# Patient Record
Sex: FEMALE | Race: ASIAN | NOT HISPANIC OR LATINO
[De-identification: names, ages, dates, MRNs, and addresses within clinical notes are randomized per-mention and may not be internally consistent; named-entity substitution may affect disease eponyms.]

---

## 2017-01-13 ENCOUNTER — MEDICATION RENEWAL (OUTPATIENT)
Age: 72
End: 2017-01-13

## 2017-02-22 ENCOUNTER — APPOINTMENT (OUTPATIENT)
Dept: CARDIOLOGY | Facility: CLINIC | Age: 72
End: 2017-02-22

## 2017-02-22 VITALS
RESPIRATION RATE: 17 BRPM | WEIGHT: 152 LBS | HEART RATE: 63 BPM | TEMPERATURE: 98.3 F | SYSTOLIC BLOOD PRESSURE: 138 MMHG | BODY MASS INDEX: 27.8 KG/M2 | DIASTOLIC BLOOD PRESSURE: 80 MMHG | OXYGEN SATURATION: 97 %

## 2017-05-02 ENCOUNTER — MEDICATION RENEWAL (OUTPATIENT)
Age: 72
End: 2017-05-02

## 2017-09-11 ENCOUNTER — OUTPATIENT (OUTPATIENT)
Dept: OUTPATIENT SERVICES | Facility: HOSPITAL | Age: 72
LOS: 1 days | End: 2017-09-11
Payer: MEDICARE

## 2017-09-11 ENCOUNTER — APPOINTMENT (OUTPATIENT)
Dept: MAMMOGRAPHY | Facility: IMAGING CENTER | Age: 72
End: 2017-09-11
Payer: MEDICARE

## 2017-09-11 ENCOUNTER — APPOINTMENT (OUTPATIENT)
Dept: ULTRASOUND IMAGING | Facility: IMAGING CENTER | Age: 72
End: 2017-09-11
Payer: MEDICARE

## 2017-09-11 DIAGNOSIS — Z00.8 ENCOUNTER FOR OTHER GENERAL EXAMINATION: ICD-10-CM

## 2017-09-11 PROCEDURE — 77063 BREAST TOMOSYNTHESIS BI: CPT | Mod: 26

## 2017-09-11 PROCEDURE — 77067 SCR MAMMO BI INCL CAD: CPT

## 2017-09-11 PROCEDURE — G0202: CPT | Mod: 26

## 2017-09-11 PROCEDURE — 76641 ULTRASOUND BREAST COMPLETE: CPT

## 2017-09-11 PROCEDURE — 77063 BREAST TOMOSYNTHESIS BI: CPT

## 2017-09-11 PROCEDURE — 76641 ULTRASOUND BREAST COMPLETE: CPT | Mod: 26,50

## 2017-10-17 ENCOUNTER — RX RENEWAL (OUTPATIENT)
Age: 72
End: 2017-10-17

## 2017-10-19 ENCOUNTER — NON-APPOINTMENT (OUTPATIENT)
Age: 72
End: 2017-10-19

## 2017-10-19 ENCOUNTER — APPOINTMENT (OUTPATIENT)
Dept: CARDIOLOGY | Facility: CLINIC | Age: 72
End: 2017-10-19
Payer: MEDICARE

## 2017-10-19 VITALS
RESPIRATION RATE: 17 BRPM | BODY MASS INDEX: 27.44 KG/M2 | WEIGHT: 150 LBS | DIASTOLIC BLOOD PRESSURE: 78 MMHG | SYSTOLIC BLOOD PRESSURE: 128 MMHG | HEART RATE: 65 BPM | TEMPERATURE: 98 F | OXYGEN SATURATION: 96 %

## 2017-10-19 DIAGNOSIS — F41.9 ANXIETY DISORDER, UNSPECIFIED: ICD-10-CM

## 2017-10-19 PROCEDURE — 99214 OFFICE O/P EST MOD 30 MIN: CPT

## 2017-10-19 PROCEDURE — 93000 ELECTROCARDIOGRAM COMPLETE: CPT

## 2017-10-24 ENCOUNTER — OTHER (OUTPATIENT)
Age: 72
End: 2017-10-24

## 2017-11-03 ENCOUNTER — MEDICATION RENEWAL (OUTPATIENT)
Age: 72
End: 2017-11-03

## 2018-04-16 ENCOUNTER — MEDICATION RENEWAL (OUTPATIENT)
Age: 73
End: 2018-04-16

## 2018-06-05 ENCOUNTER — OTHER (OUTPATIENT)
Age: 73
End: 2018-06-05

## 2018-06-19 ENCOUNTER — APPOINTMENT (OUTPATIENT)
Dept: CARDIOLOGY | Facility: CLINIC | Age: 73
End: 2018-06-19
Payer: MEDICARE

## 2018-06-19 VITALS
BODY MASS INDEX: 27.07 KG/M2 | RESPIRATION RATE: 17 BRPM | OXYGEN SATURATION: 95 % | HEART RATE: 61 BPM | WEIGHT: 148 LBS | TEMPERATURE: 97.8 F | DIASTOLIC BLOOD PRESSURE: 73 MMHG | SYSTOLIC BLOOD PRESSURE: 122 MMHG

## 2018-06-19 PROCEDURE — 99214 OFFICE O/P EST MOD 30 MIN: CPT

## 2018-08-20 ENCOUNTER — RX RENEWAL (OUTPATIENT)
Age: 73
End: 2018-08-20

## 2018-09-19 ENCOUNTER — APPOINTMENT (OUTPATIENT)
Dept: MAMMOGRAPHY | Facility: IMAGING CENTER | Age: 73
End: 2018-09-19
Payer: MEDICARE

## 2018-09-19 ENCOUNTER — APPOINTMENT (OUTPATIENT)
Dept: RADIOLOGY | Facility: IMAGING CENTER | Age: 73
End: 2018-09-19
Payer: MEDICARE

## 2018-09-19 ENCOUNTER — OUTPATIENT (OUTPATIENT)
Dept: OUTPATIENT SERVICES | Facility: HOSPITAL | Age: 73
LOS: 1 days | End: 2018-09-19
Payer: MEDICARE

## 2018-09-19 DIAGNOSIS — Z00.8 ENCOUNTER FOR OTHER GENERAL EXAMINATION: ICD-10-CM

## 2018-09-19 PROCEDURE — 77063 BREAST TOMOSYNTHESIS BI: CPT | Mod: 26

## 2018-09-19 PROCEDURE — 77080 DXA BONE DENSITY AXIAL: CPT

## 2018-09-19 PROCEDURE — 77067 SCR MAMMO BI INCL CAD: CPT | Mod: 26

## 2018-09-19 PROCEDURE — 77067 SCR MAMMO BI INCL CAD: CPT

## 2018-09-19 PROCEDURE — 77080 DXA BONE DENSITY AXIAL: CPT | Mod: 26

## 2018-09-19 PROCEDURE — 77063 BREAST TOMOSYNTHESIS BI: CPT

## 2018-09-22 ENCOUNTER — RX RENEWAL (OUTPATIENT)
Age: 73
End: 2018-09-22

## 2018-10-25 ENCOUNTER — RX RENEWAL (OUTPATIENT)
Age: 73
End: 2018-10-25

## 2018-11-01 ENCOUNTER — RX RENEWAL (OUTPATIENT)
Age: 73
End: 2018-11-01

## 2018-11-12 ENCOUNTER — RX RENEWAL (OUTPATIENT)
Age: 73
End: 2018-11-12

## 2018-11-13 ENCOUNTER — MEDICATION RENEWAL (OUTPATIENT)
Age: 73
End: 2018-11-13

## 2018-11-30 ENCOUNTER — MEDICATION RENEWAL (OUTPATIENT)
Age: 73
End: 2018-11-30

## 2019-01-03 ENCOUNTER — APPOINTMENT (OUTPATIENT)
Dept: CARDIOLOGY | Facility: CLINIC | Age: 74
End: 2019-01-03
Payer: MEDICARE

## 2019-01-03 ENCOUNTER — NON-APPOINTMENT (OUTPATIENT)
Age: 74
End: 2019-01-03

## 2019-01-03 VITALS
SYSTOLIC BLOOD PRESSURE: 134 MMHG | RESPIRATION RATE: 18 BRPM | HEART RATE: 85 BPM | TEMPERATURE: 97.8 F | BODY MASS INDEX: 27.44 KG/M2 | OXYGEN SATURATION: 97 % | WEIGHT: 150 LBS | DIASTOLIC BLOOD PRESSURE: 74 MMHG

## 2019-01-03 DIAGNOSIS — R42 DIZZINESS AND GIDDINESS: ICD-10-CM

## 2019-01-03 DIAGNOSIS — R94.31 ABNORMAL ELECTROCARDIOGRAM [ECG] [EKG]: ICD-10-CM

## 2019-01-03 LAB — GLUCOSE BLDC GLUCOMTR-MCNC: 122

## 2019-01-03 PROCEDURE — ZZZZZ: CPT

## 2019-01-03 PROCEDURE — 82962 GLUCOSE BLOOD TEST: CPT

## 2019-01-03 PROCEDURE — 93015 CV STRESS TEST SUPVJ I&R: CPT

## 2019-01-03 PROCEDURE — 99214 OFFICE O/P EST MOD 30 MIN: CPT | Mod: 25

## 2019-01-03 PROCEDURE — 93000 ELECTROCARDIOGRAM COMPLETE: CPT | Mod: 59

## 2019-01-03 PROCEDURE — 93306 TTE W/DOPPLER COMPLETE: CPT

## 2019-01-07 PROBLEM — R42 DIZZINESS: Status: ACTIVE | Noted: 2019-01-07

## 2019-01-08 ENCOUNTER — RX RENEWAL (OUTPATIENT)
Age: 74
End: 2019-01-08

## 2019-01-15 ENCOUNTER — MEDICATION RENEWAL (OUTPATIENT)
Age: 74
End: 2019-01-15

## 2019-01-15 DIAGNOSIS — K59.00 CONSTIPATION, UNSPECIFIED: ICD-10-CM

## 2019-01-15 RX ORDER — DOCUSATE SODIUM 100 MG/1
100 CAPSULE ORAL
Qty: 270 | Refills: 1 | Status: ACTIVE | COMMUNITY
Start: 2019-01-15 | End: 1900-01-01

## 2019-02-07 ENCOUNTER — RX RENEWAL (OUTPATIENT)
Age: 74
End: 2019-02-07

## 2019-03-08 ENCOUNTER — MEDICATION RENEWAL (OUTPATIENT)
Age: 74
End: 2019-03-08

## 2019-04-11 ENCOUNTER — MEDICATION RENEWAL (OUTPATIENT)
Age: 74
End: 2019-04-11

## 2019-06-12 ENCOUNTER — APPOINTMENT (OUTPATIENT)
Dept: ORTHOPEDIC SURGERY | Facility: CLINIC | Age: 74
End: 2019-06-12
Payer: MEDICARE

## 2019-06-12 VITALS
WEIGHT: 150 LBS | HEART RATE: 91 BPM | HEIGHT: 62 IN | BODY MASS INDEX: 27.6 KG/M2 | DIASTOLIC BLOOD PRESSURE: 84 MMHG | SYSTOLIC BLOOD PRESSURE: 154 MMHG

## 2019-06-12 DIAGNOSIS — M47.817 SPONDYLOSIS W/OUT MYELOPATHY OR RADICULOPATHY, LUMBOSACRAL REGION: ICD-10-CM

## 2019-06-12 DIAGNOSIS — M47.814 SPONDYLOSIS W/OUT MYELOPATHY OR RADICULOPATHY, THORACIC REGION: ICD-10-CM

## 2019-06-12 PROCEDURE — 72070 X-RAY EXAM THORAC SPINE 2VWS: CPT

## 2019-06-12 PROCEDURE — 99203 OFFICE O/P NEW LOW 30 MIN: CPT

## 2019-06-12 PROCEDURE — 72100 X-RAY EXAM L-S SPINE 2/3 VWS: CPT

## 2019-06-12 RX ORDER — MELOXICAM 15 MG/1
15 TABLET ORAL
Qty: 14 | Refills: 0 | Status: ACTIVE | COMMUNITY
Start: 2019-06-12 | End: 1900-01-01

## 2019-06-16 ENCOUNTER — EMERGENCY (EMERGENCY)
Facility: HOSPITAL | Age: 74
LOS: 1 days | Discharge: ANOTHER HOSPITAL | End: 2019-06-16
Attending: EMERGENCY MEDICINE | Admitting: EMERGENCY MEDICINE
Payer: MEDICARE

## 2019-06-16 VITALS
HEART RATE: 67 BPM | RESPIRATION RATE: 16 BRPM | OXYGEN SATURATION: 97 % | TEMPERATURE: 98 F | DIASTOLIC BLOOD PRESSURE: 83 MMHG | SYSTOLIC BLOOD PRESSURE: 155 MMHG

## 2019-06-16 PROCEDURE — 99285 EMERGENCY DEPT VISIT HI MDM: CPT | Mod: GC

## 2019-06-16 RX ORDER — MORPHINE SULFATE 50 MG/1
4 CAPSULE, EXTENDED RELEASE ORAL ONCE
Refills: 0 | Status: DISCONTINUED | OUTPATIENT
Start: 2019-06-16 | End: 2019-06-16

## 2019-06-16 NOTE — ED PROVIDER NOTE - OBJECTIVE STATEMENT
74 yoF, PMHx of HTN, otherwise in good health presents to ED with complaint of R back pain, R side pain, and RUQ pain s/p fall 4 days ago with landing on ledge of shower in bathroom. No head trauma. Ambulating since. Saw ortho and had radiographs of ribs which were negative. Tylenol and meloxicam not helping at home. Cannot sleep due to pain. Had recent cough with is worsening her pain as shes still coughing. Denies hip/knee pain. No changes in appetite/bowels/urine. No AC. No syncope/lightheadedness.

## 2019-06-16 NOTE — ED PROVIDER NOTE - PHYSICAL EXAMINATION
PHYSICAL EXAM:  GENERAL: moderate distress, well-groomed, well-developed  HEAD:  Atraumatic, Normocephalic  EYES: EOMI, PERRLA, conjunctiva and sclera clear  ENMT: No tonsillar erythema, exudates, or enlargement; Moist mucous membranes  NECK: Supple, No JVD, no cspine tenderness, no ecchymosis  HEART: Regular rate and rhythm; No murmurs, rubs, or gallops  RESPIRATORY: CTA B/L, shallow breathing, diminished on R, ecchymosis tenderness R lateral, no obvious stepoffs  ABDOMEN: Soft, RUQ tenderness, no guarding  BACK: no midline tenderness, R flank ecchymosis, normal ROM  NEURO: A&Ox3, nonfocal, moving all extremities  EXTREMITIES:  2+ Peripheral Pulses, No clubbing, cyanosis, or edema, no hip/knee tenderness, signs of trauma  SKIN: warm, dry, normal color, no rash or abnormal lesions

## 2019-06-16 NOTE — ED PROVIDER NOTE - PROGRESS NOTE DETAILS
Josselyn PGY1- 4 acute rib fx, trace PE, surgery consulted, rec trauma transfer, Dr. Magana at [facility] ED accepting, Dr. Moy of trauma service accepting

## 2019-06-16 NOTE — ED PROVIDER NOTE - ATTENDING CONTRIBUTION TO CARE
Dr. Ramires: I have personally performed a face to face bedside history and physical examination of this patient. I have discussed the history, examination, review of systems, assessment and plan of management with the resident. I have reviewed the electronic medical record and amended it to reflect my history, review of systems, physical exam, assessment and plan.    74F presents s/p fall 5 days ago. Pt fell onto her right sided, with pain at R chest wall. Pt was seen by her orthopedist, had negative xrays, started on pain meds. No head trauma, sob, vomiting. not on blood thinners.     on exam Dr. Ramires: I have personally performed a face to face bedside history and physical examination of this patient. I have discussed the history, examination, review of systems, assessment and plan of management with the resident. I have reviewed the electronic medical record and amended it to reflect my history, review of systems, physical exam, assessment and plan.    74F presents s/p fall 5 days ago. Pt fell onto her right sided, with pain at R chest wall. Pt was seen by her orthopedist, had negative xrays, started on pain meds. No head trauma, sob, vomiting. not on blood thinners.     on exam elderly, nontoxic  stable vitals  CTA b/l  contusion and ttp at R lateral/posteiror chest. no crepitus.  RRR s 1s2  abd soft, +RUQ tenderness, no rebound  NC/AT  no focal neuro deficits    chest wall contusion, will r/o underlying rib fx, hemothorax, intraabdominal injury, siginificant anemia. Plan for pain control, trauma labs, CT chest/a/p.

## 2019-06-16 NOTE — ED PROVIDER NOTE - CLINICAL SUMMARY MEDICAL DECISION MAKING FREE TEXT BOX
Haverty PGY1- sub acute trauma R sided chest/back/RUQ with ecchymosis, pain with breathing, neg radigoraphs, tylenol/meloxicam not helping, no NVD, bowel/urinary changes, no AC, no head trauma, normal ambualtion  extensive ecchymosis R flank, R chest, tender, no midline spinal tenderness, abd soft, no guarding, RUQ tender, extremities atraumatic, lungs cta, heart rrr, head atraumatic  labs, urine CT CAP, morphine  moderate suspicion for rib fx, CT to eval for traumatic pathology, pain control IS, discharge with med if studies neg

## 2019-06-16 NOTE — ED PROVIDER NOTE - CARE PLAN
Principal Discharge DX:	Closed fracture of multiple ribs of right side, initial encounter  Secondary Diagnosis:	Fall

## 2019-06-16 NOTE — ED ADULT TRIAGE NOTE - CHIEF COMPLAINT QUOTE
Pt comes in for c/o lower right side back pain/flank pain s/p slip and fall in tub Wednesday morning. Pt taking Meloxicam w/out relief prescribed by ortho who saw pt post fall. Pt appears uncomfortable in triage, vs as noted and bruising noted to right lower back/flank area. Pt denies any blood thinners.

## 2019-06-17 ENCOUNTER — EMERGENCY (EMERGENCY)
Facility: HOSPITAL | Age: 74
LOS: 1 days | Discharge: HOME | End: 2019-06-17
Attending: EMERGENCY MEDICINE
Payer: MEDICARE

## 2019-06-17 VITALS
HEART RATE: 83 BPM | RESPIRATION RATE: 16 BRPM | TEMPERATURE: 98 F | SYSTOLIC BLOOD PRESSURE: 183 MMHG | DIASTOLIC BLOOD PRESSURE: 73 MMHG | OXYGEN SATURATION: 100 %

## 2019-06-17 VITALS
SYSTOLIC BLOOD PRESSURE: 163 MMHG | WEIGHT: 149.91 LBS | OXYGEN SATURATION: 94 % | RESPIRATION RATE: 18 BRPM | TEMPERATURE: 98 F | HEART RATE: 65 BPM | DIASTOLIC BLOOD PRESSURE: 91 MMHG | HEIGHT: 61 IN

## 2019-06-17 LAB
ALBUMIN SERPL ELPH-MCNC: 4.4 G/DL — SIGNIFICANT CHANGE UP (ref 3.3–5)
ALP SERPL-CCNC: 102 U/L — SIGNIFICANT CHANGE UP (ref 40–120)
ALT FLD-CCNC: 13 U/L — SIGNIFICANT CHANGE UP (ref 4–33)
ANION GAP SERPL CALC-SCNC: 12 MMO/L — SIGNIFICANT CHANGE UP (ref 7–14)
APPEARANCE UR: CLEAR — SIGNIFICANT CHANGE UP
AST SERPL-CCNC: 18 U/L — SIGNIFICANT CHANGE UP (ref 4–32)
BASE EXCESS BLDV CALC-SCNC: 5.3 MMOL/L — SIGNIFICANT CHANGE UP
BASOPHILS # BLD AUTO: 0.06 K/UL — SIGNIFICANT CHANGE UP (ref 0–0.2)
BASOPHILS NFR BLD AUTO: 0.9 % — SIGNIFICANT CHANGE UP (ref 0–2)
BILIRUB SERPL-MCNC: 0.4 MG/DL — SIGNIFICANT CHANGE UP (ref 0.2–1.2)
BILIRUB UR-MCNC: NEGATIVE — SIGNIFICANT CHANGE UP
BLOOD GAS VENOUS - CREATININE: 0.78 MG/DL — SIGNIFICANT CHANGE UP (ref 0.5–1.3)
BLOOD GAS VENOUS - FIO2: 21 — SIGNIFICANT CHANGE UP
BLOOD UR QL VISUAL: SIGNIFICANT CHANGE UP
BUN SERPL-MCNC: 16 MG/DL — SIGNIFICANT CHANGE UP (ref 7–23)
CALCIUM SERPL-MCNC: 9.3 MG/DL — SIGNIFICANT CHANGE UP (ref 8.4–10.5)
CHLORIDE BLDV-SCNC: 107 MMOL/L — SIGNIFICANT CHANGE UP (ref 96–108)
CHLORIDE SERPL-SCNC: 101 MMOL/L — SIGNIFICANT CHANGE UP (ref 98–107)
CO2 SERPL-SCNC: 26 MMOL/L — SIGNIFICANT CHANGE UP (ref 22–31)
COLOR SPEC: YELLOW — SIGNIFICANT CHANGE UP
CREAT SERPL-MCNC: 0.77 MG/DL — SIGNIFICANT CHANGE UP (ref 0.5–1.3)
EOSINOPHIL # BLD AUTO: 0.16 K/UL — SIGNIFICANT CHANGE UP (ref 0–0.5)
EOSINOPHIL NFR BLD AUTO: 2.4 % — SIGNIFICANT CHANGE UP (ref 0–6)
GAS PNL BLDV: 136 MMOL/L — SIGNIFICANT CHANGE UP (ref 136–146)
GLUCOSE BLDV-MCNC: 129 MG/DL — HIGH (ref 70–99)
GLUCOSE SERPL-MCNC: 133 MG/DL — HIGH (ref 70–99)
GLUCOSE UR-MCNC: NEGATIVE — SIGNIFICANT CHANGE UP
HCO3 BLDV-SCNC: 27 MMOL/L — SIGNIFICANT CHANGE UP (ref 20–27)
HCT VFR BLD CALC: 39.7 % — SIGNIFICANT CHANGE UP (ref 34.5–45)
HCT VFR BLDV CALC: 40.3 % — SIGNIFICANT CHANGE UP (ref 34.5–45)
HGB BLD-MCNC: 13.1 G/DL — SIGNIFICANT CHANGE UP (ref 11.5–15.5)
HGB BLDV-MCNC: 13.1 G/DL — SIGNIFICANT CHANGE UP (ref 11.5–15.5)
IMM GRANULOCYTES NFR BLD AUTO: 0.6 % — SIGNIFICANT CHANGE UP (ref 0–1.5)
KETONES UR-MCNC: NEGATIVE — SIGNIFICANT CHANGE UP
LACTATE BLDV-MCNC: 1.1 MMOL/L — SIGNIFICANT CHANGE UP (ref 0.5–2)
LEUKOCYTE ESTERASE UR-ACNC: NEGATIVE — SIGNIFICANT CHANGE UP
LYMPHOCYTES # BLD AUTO: 1.72 K/UL — SIGNIFICANT CHANGE UP (ref 1–3.3)
LYMPHOCYTES # BLD AUTO: 25.8 % — SIGNIFICANT CHANGE UP (ref 13–44)
MCHC RBC-ENTMCNC: 31.3 PG — SIGNIFICANT CHANGE UP (ref 27–34)
MCHC RBC-ENTMCNC: 33 % — SIGNIFICANT CHANGE UP (ref 32–36)
MCV RBC AUTO: 94.7 FL — SIGNIFICANT CHANGE UP (ref 80–100)
MONOCYTES # BLD AUTO: 0.45 K/UL — SIGNIFICANT CHANGE UP (ref 0–0.9)
MONOCYTES NFR BLD AUTO: 6.7 % — SIGNIFICANT CHANGE UP (ref 2–14)
NEUTROPHILS # BLD AUTO: 4.24 K/UL — SIGNIFICANT CHANGE UP (ref 1.8–7.4)
NEUTROPHILS NFR BLD AUTO: 63.6 % — SIGNIFICANT CHANGE UP (ref 43–77)
NITRITE UR-MCNC: NEGATIVE — SIGNIFICANT CHANGE UP
NRBC # FLD: 0 K/UL — SIGNIFICANT CHANGE UP (ref 0–0)
PCO2 BLDV: 55 MMHG — HIGH (ref 41–51)
PH BLDV: 7.36 PH — SIGNIFICANT CHANGE UP (ref 7.32–7.43)
PH UR: 7 — SIGNIFICANT CHANGE UP (ref 5–8)
PLATELET # BLD AUTO: 170 K/UL — SIGNIFICANT CHANGE UP (ref 150–400)
PMV BLD: 11.3 FL — SIGNIFICANT CHANGE UP (ref 7–13)
PO2 BLDV: 34 MMHG — LOW (ref 35–40)
POTASSIUM BLDV-SCNC: 3.8 MMOL/L — SIGNIFICANT CHANGE UP (ref 3.4–4.5)
POTASSIUM SERPL-MCNC: 4.1 MMOL/L — SIGNIFICANT CHANGE UP (ref 3.5–5.3)
POTASSIUM SERPL-SCNC: 4.1 MMOL/L — SIGNIFICANT CHANGE UP (ref 3.5–5.3)
PROT SERPL-MCNC: 8 G/DL — SIGNIFICANT CHANGE UP (ref 6–8.3)
PROT UR-MCNC: NEGATIVE — SIGNIFICANT CHANGE UP
RBC # BLD: 4.19 M/UL — SIGNIFICANT CHANGE UP (ref 3.8–5.2)
RBC # FLD: 12 % — SIGNIFICANT CHANGE UP (ref 10.3–14.5)
SAO2 % BLDV: 56 % — LOW (ref 60–85)
SODIUM SERPL-SCNC: 139 MMOL/L — SIGNIFICANT CHANGE UP (ref 135–145)
SP GR SPEC: 1.01 — SIGNIFICANT CHANGE UP (ref 1–1.04)
UROBILINOGEN FLD QL: NORMAL — SIGNIFICANT CHANGE UP
WBC # BLD: 6.67 K/UL — SIGNIFICANT CHANGE UP (ref 3.8–10.5)
WBC # FLD AUTO: 6.67 K/UL — SIGNIFICANT CHANGE UP (ref 3.8–10.5)

## 2019-06-17 PROCEDURE — 99218: CPT

## 2019-06-17 PROCEDURE — 71260 CT THORAX DX C+: CPT | Mod: 26

## 2019-06-17 PROCEDURE — 93010 ELECTROCARDIOGRAM REPORT: CPT

## 2019-06-17 PROCEDURE — 74177 CT ABD & PELVIS W/CONTRAST: CPT | Mod: 26

## 2019-06-17 RX ORDER — ACETAMINOPHEN 500 MG
975 TABLET ORAL ONCE
Refills: 0 | Status: COMPLETED | OUTPATIENT
Start: 2019-06-17 | End: 2019-06-17

## 2019-06-17 RX ORDER — IBUPROFEN 200 MG
400 TABLET ORAL EVERY 6 HOURS
Refills: 0 | Status: DISCONTINUED | OUTPATIENT
Start: 2019-06-17 | End: 2019-06-21

## 2019-06-17 RX ORDER — IBUPROFEN 200 MG
600 TABLET ORAL ONCE
Refills: 0 | Status: COMPLETED | OUTPATIENT
Start: 2019-06-17 | End: 2019-06-17

## 2019-06-17 RX ORDER — MORPHINE SULFATE 50 MG/1
4 CAPSULE, EXTENDED RELEASE ORAL ONCE
Refills: 0 | Status: DISCONTINUED | OUTPATIENT
Start: 2019-06-17 | End: 2019-06-17

## 2019-06-17 RX ORDER — TRAMADOL HYDROCHLORIDE 50 MG/1
25 TABLET ORAL EVERY 6 HOURS
Refills: 0 | Status: DISCONTINUED | OUTPATIENT
Start: 2019-06-17 | End: 2019-06-18

## 2019-06-17 RX ORDER — CLONAZEPAM 1 MG
0 TABLET ORAL
Qty: 0 | Refills: 0 | DISCHARGE

## 2019-06-17 RX ORDER — KETOROLAC TROMETHAMINE 30 MG/ML
15 SYRINGE (ML) INJECTION EVERY 6 HOURS
Refills: 0 | Status: DISCONTINUED | OUTPATIENT
Start: 2019-06-17 | End: 2019-06-17

## 2019-06-17 RX ORDER — ACETAMINOPHEN 500 MG
650 TABLET ORAL EVERY 6 HOURS
Refills: 0 | Status: DISCONTINUED | OUTPATIENT
Start: 2019-06-17 | End: 2019-06-21

## 2019-06-17 RX ORDER — LIDOCAINE 4 G/100G
1 CREAM TOPICAL ONCE
Refills: 0 | Status: COMPLETED | OUTPATIENT
Start: 2019-06-17 | End: 2019-06-17

## 2019-06-17 RX ORDER — METOPROLOL TARTRATE 50 MG
0 TABLET ORAL
Qty: 0 | Refills: 0 | DISCHARGE

## 2019-06-17 RX ADMIN — LIDOCAINE 1 PATCH: 4 CREAM TOPICAL at 07:41

## 2019-06-17 RX ADMIN — Medication 975 MILLIGRAM(S): at 09:59

## 2019-06-17 RX ADMIN — Medication 15 MILLIGRAM(S): at 16:54

## 2019-06-17 RX ADMIN — LIDOCAINE 1 PATCH: 4 CREAM TOPICAL at 19:23

## 2019-06-17 RX ADMIN — Medication 600 MILLIGRAM(S): at 11:13

## 2019-06-17 RX ADMIN — Medication 100 MILLIGRAM(S): at 21:46

## 2019-06-17 RX ADMIN — Medication 600 MILLIGRAM(S): at 09:59

## 2019-06-17 RX ADMIN — MORPHINE SULFATE 4 MILLIGRAM(S): 50 CAPSULE, EXTENDED RELEASE ORAL at 05:13

## 2019-06-17 RX ADMIN — Medication 975 MILLIGRAM(S): at 11:13

## 2019-06-17 RX ADMIN — MORPHINE SULFATE 4 MILLIGRAM(S): 50 CAPSULE, EXTENDED RELEASE ORAL at 00:20

## 2019-06-17 RX ADMIN — Medication 200 MILLIGRAM(S): at 16:54

## 2019-06-17 NOTE — ED ADULT NURSE REASSESSMENT NOTE - RESPIRATORY WDL
Breathing spontaneous and unlabored. Breath sounds clear and equal bilaterally with regular rhythm. + pain ti Rt ribs with deep inspiration

## 2019-06-17 NOTE — ED ADULT NURSE REASSESSMENT NOTE - NS ED NURSE REASSESS COMMENT FT1
pt transported to [facility] ED. PT stable at this time with no signs of distress. PT breathing even and unlabored. Report given to JUNITO Olivarez at the transfer center. Pt transported by EMS and medic. Transfer sheet and consent form signed and completed in full and transferred with pt.

## 2019-06-17 NOTE — CONSULT NOTE ADULT - ASSESSMENT
Patient is a 74y old f s/p mechanical fall 4 days ago, has acute right posterolateral eighth through eleventh rib fractures and a trace right pleural effusion. Patient pulling 1500 on incentive spirometry.    PLAN:    - Multimodal pain control: standing acetaminophen 650 q6h alternating with ibuprofen 400mg q6h (so one or the other every 3 hours), lidoderm patch q24h (12 hrs on, 12 hrs off), oxycodone 5mg for breakthrough pain q6h  - Monitor patient for 6 hours (starting now) with only oral pain medication (consider CDU if necessary).  - If pain adequately controlled with PO meds, patient may follow up outpatient as needed.  - Pt should follow up with her PCP in the next 2-3 days.  - No further surgical needs at this time. Please page us with any further questions or concerns.  - D/w Dr. Farnaz Gutierrez, PGY-2  Trauma Surgery  p. [phone]

## 2019-06-17 NOTE — ED CDU PROVIDER INITIAL DAY NOTE - DETAILS
To be completed by Daytime PA 06/17/19 frequent reeval, vitals per routine, pain control, incentive spirometer (10x/hr), cough suppressants, surgery following  d/w attending

## 2019-06-17 NOTE — ED CDU PROVIDER INITIAL DAY NOTE - PROGRESS NOTE DETAILS
The patient is a 36y Female complaining of chest pain. To be completed by Daytime PA 06/17/19 CDU PROGRESS NOTE MIRTA SORIANO: Received pt at 1900 sign-out. Pt resting in stretcher in NAD. Case/plan reviewed. VSS, /72, RR17, HR 70, Temp 98.3 F. Pt ate dinner. Ambulatory around unit with steady gait. S1 S2 noted, RRR, lungs CTA b/l, BS x4 with soft, nontender abdomen. (+) Bruising noted to right flank area w/ ttp. Pt without complaints, declines pain medication currently. Will continue to monitor overnight. CDU PROGRESS NOTE PA BO: Pt resting comfortably, without complaint. NAD, VSS. Will continue to monitor. CDU NOTE MIRTA Ron: pt resting comfortably, feels improved on current pain regimen. pt using incentive spirometer as directed. NAD recent VSS. CDU NOTE MIRTA Ron: pt resting comfortably, feels improved on current pain regimen. pt using incentive spirometer as directed. Though pain improved would still feel more comfortable being observed overnight. no new complaints. NAD recent VSS. PRN pain medication orders in.

## 2019-06-17 NOTE — ED CDU PROVIDER DISPOSITION NOTE - CLINICAL COURSE
This is a 74 yoF, PMHx of HTN, recently treated for upper resp infection since last Monday (finished Z-reddy on Friday), presented to ED with complaint of R back pain, R side pain, and RUQ pain s/p fall 4 days ago with landing on ledge of shower in bathroom. No head trauma. Ambulating since. Saw ortho Dr. Shabazz and had x-rays of ribs which were negative. Tylenol and meloxicam not helping at home.   In ED, patient had CT chest and abdomen/pelvis w/ contrast showing . Acute right posterolateral eighth through eleventh rib fractures as   described above with a trace associated right pleural effusion.  2. No evidence of intra-abdominal or intrapelvic trauma. Pt was transferred to [facility] for trauma eval. Pt sent to CDU for frequent reeval, vitals q 4hrs, pain control, continued incentive spirometry and observation. This is a 74 yoF, PMHx of HTN, recently treated for upper resp infection since last Monday (finished Z-reddy on Friday), presented to ED with complaint of R back pain, R side pain, and RUQ pain s/p fall 4 days ago with landing on ledge of shower in bathroom. No head trauma. Ambulating since. Saw ortho Dr. Shabazz and had x-rays of ribs which were negative. Tylenol and meloxicam not helping at home.   In ED, patient had CT chest and abdomen/pelvis w/ contrast showing . Acute right posterolateral eighth through eleventh rib fractures as described above with a trace associated right pleural effusion. 2. No evidence of intra-abdominal or intrapelvic trauma. Pt was transferred to [facility] for trauma eval. Pt sent to CDU for frequent reeval, vitals q 4hrs, pain control, continued incentive spirometry and observation. This is a 74 yoF, PMHx of HTN, recently treated for upper resp infection since last Monday (finished Z-reddy on Friday), presented to ED with complaint of R back pain, R side pain, and RUQ pain s/p fall 4 days ago with landing on ledge of shower in bathroom. No head trauma. Ambulating since. Saw ortho Dr. Shabazz and had x-rays of ribs which were negative. Tylenol and meloxicam not helping at home.   In ED, patient had CT chest and abdomen/pelvis w/ contrast showing . Acute right posterolateral eighth through eleventh rib fractures as described above with a trace associated right pleural effusion. 2. No evidence of intra-abdominal or intrapelvic trauma. Pt was transferred to [facility] for trauma eval. Pt sent to CDU for frequent reeval, vitals q 4hrs, pain control, continued incentive spirometry and observation.    While in the CDu, patient responded well to pain medication.  Using incentive spirometer appropriately.  Trauma consult recommending outpatient follow up.  Patient medically stable for discharge home.

## 2019-06-17 NOTE — ED ADULT NURSE REASSESSMENT NOTE - NS ED NURSE REASSESS COMMENT FT1
pt family at bedside pt verbalized decrease in pain with motrin and tylenol pt pending CDU transport and report

## 2019-06-17 NOTE — ED CDU PROVIDER INITIAL DAY NOTE - CHIEF COMPLAINT
The patient is a 74y Female complaining of trauma. The patient is a 74y Female complaining of R sided pain

## 2019-06-17 NOTE — ED ADULT NURSE NOTE - NSIMPLEMENTINTERV_GEN_ALL_ED
Implemented All Fall Risk Interventions:  [place] to call system. Call bell, personal items and telephone within reach. Instruct patient to call for assistance. Room bathroom lighting operational. Non-slip footwear when patient is off stretcher. Physically safe environment: no spills, clutter or unnecessary equipment. Stretcher in lowest position, wheels locked, appropriate side rails in place. Provide visual cue, wrist band, yellow gown, etc. Monitor gait and stability. Monitor for mental status changes and reorient to person, place, and time. Review medications for side effects contributing to fall risk. Reinforce activity limits and safety measures with patient and family.

## 2019-06-17 NOTE — ED PROVIDER NOTE - CLINICAL SUMMARY MEDICAL DECISION MAKING FREE TEXT BOX
74F w/ anxiety and HTN presents as transfer from [facility] for trauma eval for 4 R rib fractures. Will need pain control, incentive spirometry. Other labs and imaging obtained at [facility]. Pts daughter gave 2mg ativan, so will hold off on additional opiates at this time until pt's mental status is reassessed after benzos. Trauma admission vs. dc

## 2019-06-17 NOTE — ED CDU PROVIDER INITIAL DAY NOTE - NS_EDPROVIDERDISPOUSERTYPE_ED_A_ED
CONST: no fevers, no chills  EYES: no pain, no vision changes  ENT: no sore throat, no ear pain, no change in hearing, +left jaw pain  CV: no chest pain, no leg swelling  RESP: no shortness of breath, no cough  ABD: no abdominal pain, no nausea, no vomiting, no diarrhea  : no dysuria, no flank pain, no hematuria  MSK: no back pain, no extremity pain  NEURO: no headache or additional neurologic complaints  HEME: no easy bleeding  SKIN:  no rash
Attending Attestation (For Attendings USE Only)...

## 2019-06-17 NOTE — ED CDU PROVIDER DISPOSITION NOTE - ATTENDING CONTRIBUTION TO CARE
I performed a history and physical exam of the patient and discussed their management with the PA. I reviewed the PA's note and agree with the documented findings and plan of care. Patient is stable for discharge with analgesia, incentive spirometry and follow up in Surgery clinics.  ATTG: Dr. Drew

## 2019-06-17 NOTE — ED CDU PROVIDER INITIAL DAY NOTE - PHYSICAL EXAMINATION
To be completed by Daytime PA 06/17/19 chest wall- R side rib area with ecchymosis, ttp, no crepitus. no flail chest. spine- no midline tenderness. pelvis stable. b/l LE- non-tender, FROM. b/l UE- non-tender, FROM. no obvious head trauma.

## 2019-06-17 NOTE — ED CDU PROVIDER DISPOSITION NOTE - PLAN OF CARE
1. Stay hydrated. Avoid strenous activity, twisting and heavy lifting.  2. Take Ibuprofen 600mg every 6hrs for pain as needed- take with food.   3. Follow up with your PCP  24-48 hours  4. Return to ED for worsening of symptoms including fever, weakness, numbness, and all other concerns. 1. Stay hydrated. Avoid strenuous activity, twisting and heavy lifting.  2.  Take Tylenol 650mgs every 4-6 hrs and/or Ibuprofen 600mgs every 6 hrs as needed for mild to moderate pain  3.  Take Tramadol 25-50mgs every 8 hrs as needed for severe pain  4.  Apply Lidoderm patch to affected area.  Leave on for 12 hours, then take off for 12 hrs.  If prescription is not covered by insurance, you can purchase salonpas over-the-counter  5.  Take Tessalon perles 1 tab every 8 hours as needed for cough.  6.  Use incentive spirometer 10x per hour while awake  7.  Follow up with your PMD in 2-3 days.  Bring a copy of your results with you  8.  Return to the ER for worsening pain, shortness of breath, fevers/chills or any other concerning symptoms

## 2019-06-17 NOTE — ED CDU PROVIDER INITIAL DAY NOTE - ATTENDING CONTRIBUTION TO CARE
Nemes - 75yo F w/ hx of HTN transferred from [facility] for 4 rib fractures and a small hemothorax vs pleural effusion (accepted by Trauma). Fall occurred 4 days ago at home, fell in the shower on to the shower edge. C/o pain to R side, received 4mg morphine IV for pain at [facility]. On exam significant ecchymosis to R chest and flank, TTP, lungs clear, no other signs of trauma. Trauma consult recommend pain meds and possible DC given injury sustained 4 days ago. D/w patient and CDU PA, will observe overnight for pain control.

## 2019-06-17 NOTE — ED PROVIDER NOTE - OBJECTIVE STATEMENT
74F w/ hx of HTN presents as a transfer from [facility] for 4 rib fractures and a small hemothorax vs pleural effusion. Fall occurred 4 days ago at home, fell in the shower on to the shower edge. Pt has resolving URI and has lingering cough from that. States it is extraordinarily painful when she coughs. WEnt to orthopedist a few days ago and was given meloxicam, which hasn't helped. Pt denies head strike. Not on blood thinners. Labs at [facility] showed normal CBC, CMP, VBG, UA. CT A/P wnl. CT chest showed "acute right posterolateral eighth through eleventh rib fractures as described above with a trace associated right pleural effusion." Pt received 4mg morphine IV for pain at [facility]. She was evaluated by surgery team there who recommended transfer to [facility] for trauma eval. Pt's daughter seen giving patient home 2mg ativan after provider left the room here.

## 2019-06-17 NOTE — ED ADULT NURSE NOTE - OBJECTIVE STATEMENT
pt a&o x3, daughter at bedside as supplemental , c/o back pain s/p mechanical fall on wednesday. was seen by  and given meloxicom, no pain relief from tylenol or meloxicom. pt ambulatory steadily. right back bruising noted. states pain radiates to right side of abdomen. right ac 20g placed; md assessed. will monitor

## 2019-06-17 NOTE — CONSULT NOTE ADULT - SUBJECTIVE AND OBJECTIVE BOX
[facility] General Surgery Consultation     HPI:  This is a 74 yoF, PMHx of HTN, recently treated for upper resp infection since last Monday (finished Z-reddy on Friday), otherwise in good health presents to ED with complaint of R back pain, R side pain, and RUQ pain s/p fall 4 days ago with landing on ledge of shower in bathroom. No head trauma. Ambulating since. Saw ortho Dr. Shabazz and had x-rays of ribs which were negative. Tylenol and meloxicam not helping at home. Cannot sleep due to pain. Had recent cough with is worsening her pain as shes still coughing. Denies hip/knee pain. No changes in appetite/bowels/urine. No AC. No syncope/lightheadedness.    Primary Survey:    A - airway intact  B - bilateral breath sounds   C - initial BP: 149/71 (19 @ 04:31)*** , HR: 78 (19 @ 04:31)*** , palpable pulses in all extremities  D - GCS 15 on arrival  Exposure obtained    Secondary Survey:   General: mild distress   HEENT: Normocephalic, atraumatic   Neck: Soft, midline trachea  Chest: right chest wall tenderness, right chest wall ecchymosis   Cardiac: RRR.   Respiratory: Bilateral breath sounds, clear and decreased at base on right side.   Abdomen: Soft, non-distended, non-tender, no rebound, no guarding, no ecchymosis.   Pelvis: Stable, non-tender.   Ext: palp radial b/l UE, b/l DP palp in Lower Extrem.   Back: no TTP, no palpable runoff/stepoff/deformity, no abrasions.     ROS: 10-system review is otherwise negative except HPI above.        PAST MEDICAL & SURGICAL HISTORY:  Anxiety  HTN (hypertension)  Chronic Hep B  No significant past surgical history      ROS: 10-point review of systems   FAMILY HISTORY:      SOCIAL HISTORY:    MEDICATIONS  (STANDING):  acetaminophen   Tablet .. 975 milliGRAM(s) Oral once  ibuprofen  Tablet. 600 milliGRAM(s) Oral Once    MEDICATIONS  (PRN):    MEDICATIONS (home)  clonazePAM: 1mg qHS PRN (2019 07:13)  metoprolol:  50mg qDay in AM and 25mg qDay in PM (2019 07:13)  meloxicam 15mg qDay (since Wednesday)  Enalapril 10mg BID  Vemlidy 25mg qDay      Allergies    penicillin (Hives)    Intolerances        Vital Signs Last 24 Hrs  T(C): 36.7 (2019 07:13), Max: 37.2 (2019 04:31)  T(F): 98.1 (2019 07:13), Max: 98.9 (2019 04:31)  HR: 60 (2019 08:29) (60 - 83)  BP: 122/75 (2019 08:29) (122/75 - 183/73)  BP(mean): --  RR: 16 (2019 08:29) (16 - 20)  SpO2: 95% (2019 08:29) (94% - 100%)  Daily Height in cm: 154.94 (2019 07:00)    Daily                             13.1   6.67  )-----------( 170      ( 2019 00:30 )             39.7         139  |  101  |  16  ----------------------------<  133<H>  4.1   |  26  |  0.77    Ca    9.3      2019 00:30    TPro  8.0  /  Alb  4.4  /  TBili  0.4  /  DBili  x   /  AST  18  /  ALT  13  /  AlkPhos  102        Urinalysis Basic - ( 2019 00:30 )    Color: YELLOW / Appearance: CLEAR / S.010 / pH: 7.0  Gluc: NEGATIVE / Ketone: NEGATIVE  / Bili: NEGATIVE / Urobili: NORMAL   Blood: TRACE / Protein: NEGATIVE / Nitrite: NEGATIVE   Leuk Esterase: NEGATIVE / RBC: x / WBC x   Sq Epi: x / Non Sq Epi: x / Bacteria: x    Radiographic Findings:     < from: CT Chest w/ IV Cont (19 @ 02:27) >  FINDINGS:    CHEST:     LUNGS AND LARGE AIRWAYS: Patent central airways. No pulmonary nodules,   masses or consolidation. Right upper lobe calcified granuloma. Right   lower lobe compressive atelectasis.  PLEURA: Trace right pleural effusion. No pneumothorax.  VESSELS: Within normal limits.  HEART: Heart size is normal. No pericardial effusion.  MEDIASTINUM AND DALIA: No lymphadenopathy.  CHEST WALL AND LOWER NECK: 4 mm calcification in the left lobe of the   thyroid. Old anterior right fourth rib fracture deformity. Acute, mildly   displaced posterolateral right eighth rib fracture. Acute, displaced   posterolateral right ninth rib fracture with the anterior fracture   fragment posteriorly overriding the posterior fracture fragment. Impacted   posterior right tenth rib fracture. Nondisplaced posterior right eleventh   rib fracture.    ABDOMEN AND PELVIS:    LIVER: Within normal limits.  BILE DUCTS: Normal caliber.  GALLBLADDER: Distended with a 1.0 cm gallstone in the neck. No associated   inflammatory changes. Unchanged from prior study in 2016.  SPLEEN: Within normal limits.  PANCREAS: Within normal limits.  ADRENALS: Within normal limits.  KIDNEYS/URETERS: Right renal cyst. Left kidney within normal limits.    BLADDER: Within normal limits.  REPRODUCTIVE ORGANS: Fibroid uterus. Ovaries are within normal limits.    BOWEL: Nobowel obstruction inflammation. Appendix is normal.  PERITONEUM: No ascites.  VESSELS:  Atherosclerotic calcifications.  RETROPERITONEUM: No lymphadenopathy.    ABDOMINAL WALL: Moderate right flank subcutaneous inflammatory change.  BONES: Degenerative changes of the spine.    IMPRESSION:     1. Acute right posterolateral eighth through eleventh rib fractures as   described above with a trace associated right pleural effusion.  2. No evidence of intra-abdominal or intrapelvic trauma.    < end of copied text >

## 2019-06-17 NOTE — CONSULT NOTE ADULT - SUBJECTIVE AND OBJECTIVE BOX
HPI: Patient is a 74y old  Female who presents with a chief complaint of right chest wall pain     This is a 74 yoF, PMHx of HTN, recently treated for upper resp infection since Monday, otherwise in good health presents to ED with complaint of R back pain, R side pain, and RUQ pain s/p fall 4 days ago with landing on ledge of shower in bathroom. No head trauma. Ambulating since. Saw ortho Dr. Shabazz and had x-rays of ribs which were negative. Tylenol and meloxicam not helping at home. Cannot sleep due to pain. Had recent cough with is worsening her pain as shes still coughing. Denies hip/knee pain. No changes in appetite/bowels/urine. No AC. No syncope/lightheadedness.    Primary Survey:    A - airway intact  B - bilateral breath sounds   C - initial BP: 149/71 (19 @ 04:31)*** , HR: 78 (19 @ 04:31)*** , palpable pulses in all extremities  D - GCS 15 on arrival  Exposure obtained    Secondary Survey:   General: mild distress   HEENT: Normocephalic, atraumatic   Neck: Soft, midline trachea  Chest: right chest wall tenderness, right chest wall ecchymosis   Cardiac: RRR.   Respiratory: Bilateral breath sounds, clear and decreased at base on right side.   Abdomen: Soft, non-distended, non-tender, no rebound, no guarding, no ecchymosis.   Pelvis: Stable, non-tender.   Ext: palp radial b/l UE, b/l DP palp in Lower Extrem.   Back: no TTP, no palpable runoff/stepoff/deformity, no abrasions.     ROS: 10-system review is otherwise negative except HPI above.      LABS:      CBC Full  -  ( 2019 00:30 )  WBC Count : 6.67 K/uL  RBC Count : 4.19 M/uL  Hemoglobin : 13.1 g/dL  Hematocrit : 39.7 %  Platelet Count - Automated : 170 K/uL  Mean Cell Volume : 94.7 fL  Mean Cell Hemoglobin : 31.3 pg  Mean Cell Hemoglobin Concentration : 33.0 %  Auto Neutrophil # : 4.24 K/uL  Auto Lymphocyte # : 1.72 K/uL  Auto Monocyte # : 0.45 K/uL  Auto Eosinophil # : 0.16 K/uL  Auto Basophil # : 0.06 K/uL  Auto Neutrophil % : 63.6 %  Auto Lymphocyte % : 25.8 %  Auto Monocyte % : 6.7 %  Auto Eosinophil % : 2.4 %  Auto Basophil % : 0.9 %        139  |  101  |  16  ----------------------------<  133<H>  4.1   |  26  |  0.77    Ca    9.3      2019 00:30    TPro  8.0  /  Alb  4.4  /  TBili  0.4  /  DBili  x   /  AST  18  /  ALT  13  /  AlkPhos  102            Urinalysis Basic - ( 2019 00:30 )    Color: YELLOW / Appearance: CLEAR / S.010 / pH: 7.0  Gluc: NEGATIVE / Ketone: NEGATIVE  / Bili: NEGATIVE / Urobili: NORMAL   Blood: TRACE / Protein: NEGATIVE / Nitrite: NEGATIVE   Leuk Esterase: NEGATIVE / RBC: x / WBC x   Sq Epi: x / Non Sq Epi: x / Bacteria: x                RADIOLOGY & ADDITIONAL STUDIES (The following images were personally reviewed):  PROCEDURE:   CT of the Chest, Abdomen and Pelvis was performed with intravenous   contrast.   Imaging was performed through the chest in the arterial phase followed by   imaging of the abdomen and pelvis in the portal venous phase.  Intravenous contrast: 90 ml Omnipaque 350. 10 ml discarded.  Oral contrast: None.  Sagittal and coronal reformats were performed.    FINDINGS:    CHEST:     LUNGS AND LARGE AIRWAYS: Patent central airways. No pulmonary nodules,   masses or consolidation. Right upper lobe calcified granuloma. Right   lower lobe compressive atelectasis.  PLEURA: Trace right pleural effusion. No pneumothorax.  VESSELS: Within normal limits.  HEART: Heart size is normal. No pericardial effusion.  MEDIASTINUM AND DALIA: No lymphadenopathy.  CHEST WALL AND LOWER NECK: 4 mm calcification in the left lobe of the   thyroid. Old anterior right fourth rib fracture deformity. Acute, mildly   displaced posterolateral right eighth rib fracture. Acute, displaced   posterolateral right ninth rib fracture with the anterior fracture   fragment posteriorly overriding the posterior fracture fragment. Impacted   posterior right tenth rib fracture. Nondisplaced posterior right eleventh   rib fracture.    ABDOMEN AND PELVIS:    LIVER: Within normal limits.  BILE DUCTS: Normal caliber.  GALLBLADDER: Distended with a 1.0 cm gallstone in the neck. No associated   inflammatory changes. Unchanged from prior study in 2016.  SPLEEN: Within normal limits.  PANCREAS: Within normal limits.  ADRENALS: Within normal limits.  KIDNEYS/URETERS: Right renal cyst. Left kidney within normal limits.    BLADDER: Within normal limits.  REPRODUCTIVE ORGANS: Fibroid uterus. Ovaries are within normal limits.    BOWEL: No bowel obstruction inflammation. Appendix is normal.  PERITONEUM: No ascites.  VESSELS:  Atherosclerotic calcifications.  RETROPERITONEUM: No lymphadenopathy.    ABDOMINAL WALL: Moderate right flank subcutaneous inflammatory change.  BONES: Degenerative changes of the spine.    IMPRESSION:     1. Acute right posterolateral eighth through eleventh rib fractures as   described above with a trace associated right pleural effusion.  2. No evidence of intra-abdominal or intrapelvic trauma.        ---------------------------------------------------------------------------------------  ASSESSMENT: Patient is a 74y old f s/p mechanical fall 4 days ago, has acute right posterolateral eighth through eleventh rib fractures  PLAN:    - pain control   - will be transferred to [facility] for evaluation by the trauma team.    - Patient see, discussed with attending, Dr. Burch     [id number]

## 2019-06-17 NOTE — ED PROVIDER NOTE - PHYSICAL EXAMINATION
General: WN/WD NAD  HEENT: PERRLA, EOMI, moist mucous membranes  Neurology: A&Ox3, nonfocal, GREENE x 4  Respiratory: CTA B/L, normal respiratory effort, no wheezes, crackles, rales, TTP over the R lower ribs  CV: RRR, S1S2, no murmurs, rubs or gallops  Abdominal: Soft, NT, ND +BS, Last BM  Extremities: No edema, + peripheral pulses  Incisions: None  Tubes: PIV

## 2019-06-17 NOTE — ED ADULT NURSE NOTE - OBJECTIVE STATEMENT
pt 73 yo Mandarin speaking female transfer from [facility] for rib fx r side with small pleural effusin right side pt alert Mandarin speaking fall 4 dys ago saw ortho that day for eval no findings pain with bruising continued on arrival pt accompanied by daughter alert oriented bruising right mid gavi rad to front bilateral clear breath sounds no chest pain

## 2019-06-17 NOTE — ED CDU PROVIDER INITIAL DAY NOTE - OBJECTIVE STATEMENT
To be completed by Daytime PA 06/17/19 Daughter translating at patient request  74F w/ hx of HTN presented to ED as a transfer from [facility] for 4 rib fractures and trace pleural effusion. Fall occurred 4 days ago at home, fell in the shower on to the shower edge. Pt has lingering cough from URI- treated with z-reddy (completed), which exacerbates pain.  pt went to orthopedist day of event and had x-rays done that were negative for fractures and was given meloxicam, which hasn't helped. denies head injury, LOC. denies all other injuries.  Not on blood thinners. Labs at [facility] showed normal CBC, CMP, VBG, UA. CT A/P wnl. CT chest showed "acute right posterolateral eighth through eleventh rib fractures as described above with a trace associated right pleural effusion." Pt received 4mg morphine IV for pain at [facility]. She was evaluated by surgery team there who recommended transfer to [facility] for trauma eval. pain moderate now after meds by ED. denies sob/dyspnea. denies fever, numbness, tingling, weakness of arm/leg.

## 2019-06-17 NOTE — ED PROVIDER NOTE - NS ED ROS FT
REVIEW OF SYSTEMS:    Constitutional:     [ ] negative [- ] fevers [ -] chills [ ] weight loss [ ] weight gain  HEENT:                  [ ] negative [ ] dry eyes [ ] eye irritation [ ] postnasal drip [ ] nasal congestion  CV:                         [ ] negative  [+ ] chest pain [- ] orthopnea [ ] palpitations [ ] murmur  Resp:                     [ ] negative [+ ] cough [ -] shortness of breath [- ] dyspnea [ -] wheezing [ ] sputum [ ] hemoptysis  GI:                          [ ] negative [ -] nausea [ -] vomiting [ ] diarrhea [ ] constipation [ ] abd pain [ ] dysphagia   :                        [ ] negative [ ] dysuria [ ] nocturia [ ] hematuria [ ] increased urinary frequency  Musculoskeletal: [ ] negative [ -] back pain [ ] myalgias [ ] arthralgias [ ] fracture  Skin:                       [ ] negative [ -] rash [ ] itch  Neurological:        [ ] negative [ -] headache [ ] dizziness [ ] syncope [ ] weakness [ ] numbness  Psychiatric:           [ ] negative [ ] anxiety [ ] depression  Endocrine:            [ ] negative [- ] diabetes [ ] thyroid problem  Heme/Lymph:      [ ] negative [ ] anemia [ ] bleeding problem  Allergic/Immune: [ ] negative [ ] itchy eyes [ ] nasal discharge [ ] hives [ ] angioedema    [x ] All other systems negative  [ ] Unable to assess ROS because ________.

## 2019-06-17 NOTE — ED CDU PROVIDER DISPOSITION NOTE - NSFOLLOWUPINSTRUCTIONS_ED_ALL_ED_FT
1. Stay hydrated. Avoid strenuous activity, twisting and heavy lifting.  2.  Take Tylenol 650mgs every 4-6 hrs and/or Ibuprofen 600mgs every 6 hrs as needed for mild to moderate pain  3.  Take Tramadol 25-50mgs every 8 hrs as needed for severe pain  4.  Apply Lidoderm patch to affected area.  Leave on for 12 hours, then take off for 12 hrs.  If prescription is not covered by insurance, you can purchase salonpas over-the-counter  5.  Take Tessalon perles 1 tab every 8 hours as needed for cough.  6.  Use incentive spirometer 10x per hour while awake  7.  Follow up with your PMD in 2-3 days.  Bring a copy of your results with you  8.  Return to the ER for worsening pain, shortness of breath, fevers/chills or any other concerning symptoms

## 2019-06-18 VITALS
RESPIRATION RATE: 16 BRPM | TEMPERATURE: 98 F | HEART RATE: 62 BPM | SYSTOLIC BLOOD PRESSURE: 151 MMHG | OXYGEN SATURATION: 97 % | DIASTOLIC BLOOD PRESSURE: 78 MMHG

## 2019-06-18 PROCEDURE — 96374 THER/PROPH/DIAG INJ IV PUSH: CPT

## 2019-06-18 PROCEDURE — 99217: CPT

## 2019-06-18 PROCEDURE — 99284 EMERGENCY DEPT VISIT MOD MDM: CPT | Mod: 25

## 2019-06-18 PROCEDURE — G0378: CPT

## 2019-06-18 PROCEDURE — 93005 ELECTROCARDIOGRAM TRACING: CPT

## 2019-06-18 RX ORDER — LIDOCAINE 4 G/100G
1 CREAM TOPICAL
Qty: 7 | Refills: 0
Start: 2019-06-18

## 2019-06-18 RX ORDER — TRAMADOL HYDROCHLORIDE 50 MG/1
1 TABLET ORAL
Qty: 9 | Refills: 0
Start: 2019-06-18 | End: 2019-06-20

## 2019-06-18 RX ADMIN — Medication 400 MILLIGRAM(S): at 02:14

## 2019-06-18 RX ADMIN — Medication 400 MILLIGRAM(S): at 03:43

## 2019-06-18 RX ADMIN — TRAMADOL HYDROCHLORIDE 25 MILLIGRAM(S): 50 TABLET ORAL at 04:50

## 2019-06-18 RX ADMIN — Medication 100 MILLIGRAM(S): at 05:54

## 2019-06-18 NOTE — ED CDU PROVIDER SUBSEQUENT DAY NOTE - MEDICAL DECISION MAKING DETAILS
Patient is resting comfortably with pain under control. If patient continues to tolerate well, will discharge home with analgesia, incentive spirometry and folllow up in Surgery clinics.  ATTG: Dr. Drew

## 2019-06-18 NOTE — ED CDU PROVIDER SUBSEQUENT DAY NOTE - PROGRESS NOTE DETAILS
CDU PROGRESS NOTE PA BO: Pt c/o pain to right flank/back 5/10 in severity. Will give Ibuprofen 400mg po and monitor. CDU PROGRESS NOTE PA BO: Pt states pain returning, requesting medication. Will give Tramadol 25mg po and monitor. CDU PROGRESS NOTE PA BO: Pt resting comfortably, feeling well without complaint. NAD, Lungs CTAB. Will continue to monitor. Patient seen at bedside in NAD.  VSS.  Patient resting comfortably without complaints. Pain well controlled on tramadol.  Patient educated on incentive spirometer and pain medication use.  Strict return precautions provided.  Patient and family at bedside express understanding.  -Brian Nicholas PA-C

## 2019-06-18 NOTE — ED CDU PROVIDER SUBSEQUENT DAY NOTE - PHYSICAL EXAMINATION
Gen: AAO x 3, NAD  Skin: ecchymosis  over right chest wall  HEENT: NC/AT, PERRLA, EOMI, MMM  Resp: unlabored CTAB  Cardiac: rrr s1s2, no murmurs, rubs or gallops.  right chest wall ttp, no crepitus  GI: ND, +BS, Soft, NT  Ext: no pedal edema, FROM in all extremities  MSK: No midline cervical/thoracic/lumbar TTP, compartments soft, FROM in all extremities   Neuro: no focal deficits

## 2019-06-18 NOTE — ED ADULT NURSE REASSESSMENT NOTE - RESPONSE TO
Please call patient to schedule 2 week and 6 week postpartum visits:    Date of Delivery: 10/19/17  Anticipated Date of Discharge: 10/21/17    Other:  Infant currently admitted to NICU, will hold on NBV at this time. Please confirm with mother where baby will be seen as baby's PCP listed is from [facility].    Please document all calls and route back to [facility] OB/GYN COORDINATOR.    Tiny Hurtado RN       response to care/treatments:

## 2019-06-18 NOTE — ED ADULT NURSE REASSESSMENT NOTE - NS ED NURSE REASSESS COMMENT FT1
Report taken from Ashley DURÁN RN. states pt have a good night with no complaints. Will continue to monitor. Report taken from Ashley DURÁN RN. states pt have a good night overall.  Woke up with pain. Pt given pain meds and went back to sleep. No complaints after pain meds. Will continue to monitor.

## 2019-06-18 NOTE — ED CDU PROVIDER SUBSEQUENT DAY NOTE - HISTORY
CDU PROGRESS NOTE PA BO: Pt resting comfortably, reports having cough, declines pain medication currently. Patient pulling 1500 on incentive spirometry. Will continue to monitor.

## 2019-06-18 NOTE — ED ADULT NURSE REASSESSMENT NOTE - GENERAL PATIENT STATE
resting/sleeping/pt sleeping at present
comfortable appearance/cooperative
comfortable appearance/family/SO at bedside/cooperative/improvement verbalized

## 2019-06-20 PROBLEM — F41.9 ANXIETY DISORDER, UNSPECIFIED: Chronic | Status: ACTIVE | Noted: 2019-06-17

## 2019-06-20 PROBLEM — I10 ESSENTIAL (PRIMARY) HYPERTENSION: Chronic | Status: ACTIVE | Noted: 2019-06-16

## 2019-06-21 ENCOUNTER — APPOINTMENT (OUTPATIENT)
Dept: ORTHOPEDIC SURGERY | Facility: CLINIC | Age: 74
End: 2019-06-21
Payer: MEDICARE

## 2019-06-21 DIAGNOSIS — S22.41XA MULTIPLE FRACTURES OF RIBS, RIGHT SIDE, INITIAL ENCOUNTER FOR CLOSED FRACTURE: ICD-10-CM

## 2019-06-21 PROCEDURE — 99214 OFFICE O/P EST MOD 30 MIN: CPT

## 2019-06-21 RX ORDER — TRAMADOL HYDROCHLORIDE 50 MG/1
50 TABLET, COATED ORAL 3 TIMES DAILY
Qty: 60 | Refills: 0 | Status: ACTIVE | COMMUNITY
Start: 2019-06-21 | End: 1900-01-01

## 2019-06-22 NOTE — ED POST DISCHARGE NOTE - REASON FOR FOLLOW-UP
Other pts daughter called, would like refill of tessalon perles for cough, discussed she needs to f/u with PCP, although PCP is closed. Discussed we cannot give refills on medications can return to ed for reevaluation or go to urgent care, she notes understanding. -Anum Martinez PA-C

## 2019-06-23 NOTE — HISTORY OF PRESENT ILLNESS
[de-identified] : Patient is here today for evaluation of right rib pain secondary to a fall in her bathroom on 6/12/19. She was seen at [facility] this past Sunday, she had a CT scan and was diagnosed with 4 rib fractures on the right side involving ribs 8-11. She was discharged on Motrin and tramadol which she has found helpful. She is growing a lidocaine patch but she does not find much relief from. The patient has no shortness of breath, no difficulty breathing. She has recently been evaluated by her PCP and was being treated for a cough prior to her recent fall with cough suppressants.  She still has a mild cough and has increased rib pain when she is coughing. The patient's daughter is present with her at the office visit and is translating the patient's primary language.

## 2019-06-23 NOTE — DISCUSSION/SUMMARY
[de-identified] : Discussed findings of today's exam and possible causes of patient's pain.  Educated patient on their diagnosis of right ribs 8-11 fractures.  Reviewed possible courses of treatment, and we collaboratively decided best course of treatment at this time will include conservative management. We reviewed the patient's CT scan which shows minimal displacement of her fracture is within ribs 8-9, no displacement of ribs 10-11.  While the patient does have 4 consecutive rib fractures, they are not causing her any difficulty breathing, and she has not developed any subsequent flail chest. At this time I believe patient will benefit from nonsurgical management. I had a lengthy discussion with the patient and her daughter explaining the natural course of rib fracture recovery. Advised that it'll likely take 2-3 months for these pains to resolve. The primary focus of her treatment plan will be pain control. She may continue take Motrin and tramadol as needed for pain.  A prescription of tramadol has been prescribed, I informed the patient that this is a narcotic pain medication and while it is of low potency, it still causes sedation and it should not be taken while operating machinery, or while caring for children/family members alone.  I also advised the patient that all narcotic pain medications have addictive potential and therefore should be taken as little as possible, and for the shortest duration needed. We discussed appropriate timing and dosing, and the patient only needs to take the amount of medication as needed for pain control, she is not required to be taking medication around-the-clock. Recommend followup in 2 weeks for reassessment, would recommend office x-rays of the right ribs at that time to ensure that there is no displacement of her rib fractures. She continues to have no significant change in her condition we will continue conservative management. If she develops any shortness of breath or difficulty breathing she should notify my office immediately or go directly to emergency room. The patient and her daughter understand, appreciate, and agree with current plan. The patient's daughter was utilized to translate and can indicate what the patient and her primary language.  \par \par This note was generated using dragon medical dictation software.  A reasonable effort has been made for proofreading its contents, but typos may still remain.  If there are any questions or points of clarification needed please notify my office.

## 2019-06-23 NOTE — PHYSICAL EXAM
[de-identified] : Constitutional: Well-nourished, well-developed, No acute distress\par Respiratory:  Good respiratory effort, no SOB\par Lymphatic: No regional lymphadenopathy, no lymphedema\par Psychiatric: Pleasant and normal affect, alert and oriented x3\par Skin: Clean dry and intact B/L UE/LE\par Musculoskeletal: normal except where as noted in regional exam\par \par Rib cage:\par \par APPEARANCE:  + Ecchymosis overlying right lower ribs on the right flank\par \par POSITIVE TENDERNESS: + Significant TTP of the right 8-12 ribS with diffuse pain along the neighboring ribs and costochondral junctions\par \par NONTENDER:  No tenderness left sided ribs 1-12 along the anterior, body and posterior portions b/l, no bony tenderness of the thoracic spine, no facet tenderness, no tenderness of xiphoid, sternum or manubrium, no clavicular, SC or AC joint tenderness b/l.  \par \par ROM:  Moderately decreased excursion of the rib cage with increased pain on deep breathing \par \par Thoracic Spine Exam:  normal curvature and normal alignment. forward stooped posture due to pain.  no midline bony tenderness, no marked spasm. no marked tenderness in paraspinal muscles.  ROM limited in all directions due to pain\par  [de-identified] : I reviewed and clinically correlated the following outside imaging studies,\par \par EXAM: CT ABDOMEN AND PELVIS IC \par \par EXAM: CT CHEST IC \par \par \par PROCEDURE DATE: Jun 17 2019 \par \par \par \par INTERPRETATION: CLINICAL INFORMATION: Trauma 4 days ago. Right upper \par quadrant tenderness and ecchymosis to the right lateral side. \par \par COMPARISON: CT abdomen and pelvis from 2/25/2016. \par \par PROCEDURE: \par CT of the Chest, Abdomen and Pelvis was performed with intravenous contrast. \par Imaging was performed through the chest in the arterial phase followed by \par imaging of the abdomen and pelvis in the portal venous phase. \par Intravenous contrast: 90 ml Omnipaque 350. 10 ml discarded. \par Oral contrast: None. \par Sagittal and coronal reformats were performed. \par \par FINDINGS: \par \par CHEST: \par \par LUNGS AND LARGE AIRWAYS: Patent central airways. No pulmonary nodules, \par masses or consolidation. Right upper lobe calcified granuloma. Right lower \par lobe compressive atelectasis. \par PLEURA: Trace right pleural effusion. No pneumothorax. \par VESSELS: Within normal limits. \par HEART: Heart size is normal. No pericardial effusion. \par MEDIASTINUM AND DALIA: No lymphadenopathy. \par CHEST WALL AND LOWER NECK: 4 mm calcification in the left lobe of the \par thyroid. Old anterior right fourth rib fracture deformity. Acute, mildly \par displaced posterolateral right eighth rib fracture. Acute, displaced \par posterolateral right ninth rib fracture with the anterior fracture fragment \par posteriorly overriding the posterior fracture fragment. Impacted posterior \par right tenth rib fracture. Nondisplaced posterior right eleventh rib fracture. \par \par ABDOMEN AND PELVIS: \par \par LIVER: Within normal limits. \par BILE DUCTS: Normal caliber. \par GALLBLADDER: Distended with a 1.0 cm gallstone in the neck. No associated \par inflammatory changes. Unchanged from prior study in 2016. \par SPLEEN: Within normal limits. \par PANCREAS: Within normal limits. \par ADRENALS: Within normal limits. \par KIDNEYS/URETERS: Right renal cyst. Left kidney within normal limits. \par \par BLADDER: Within normal limits. \par REPRODUCTIVE ORGANS: Fibroid uterus. Ovaries are within normal limits. \par \par BOWEL: No bowel obstruction inflammation. Appendix is normal. \par PERITONEUM: No ascites. \par VESSELS: Atherosclerotic calcifications. \par RETROPERITONEUM: No lymphadenopathy. \par ABDOMINAL WALL: Moderate right flank subcutaneous inflammatory change. \par BONES: Degenerative changes of the spine. \par \par IMPRESSION: \par \par 1. Acute right posterolateral eighth through eleventh rib fractures as \par described above with a trace associated right pleural effusion. \par 2. No evidence of intra-abdominal or intrapelvic trauma. \par

## 2019-07-10 ENCOUNTER — APPOINTMENT (OUTPATIENT)
Dept: ORTHOPEDIC SURGERY | Facility: CLINIC | Age: 74
End: 2019-07-10

## 2019-11-20 NOTE — PHYSICAL EXAM
[General Appearance - Well Developed] : well developed [Normal Appearance] : normal appearance [Well Groomed] : well groomed [General Appearance - Well Nourished] : well nourished [No Deformities] : no deformities [General Appearance - In No Acute Distress] : no acute distress [Normal Conjunctiva] : the conjunctiva exhibited no abnormalities [Eyelids - No Xanthelasma] : the eyelids demonstrated no xanthelasmas [Normal Oral Mucosa] : normal oral mucosa [No Oral Pallor] : no oral pallor [No Oral Cyanosis] : no oral cyanosis [Normal Jugular Venous A Waves Present] : normal jugular venous A waves present [Normal Jugular Venous V Waves Present] : normal jugular venous V waves present [No Jugular Venous Aggarwal A Waves] : no jugular venous aggarwal A waves [Respiration, Rhythm And Depth] : normal respiratory rhythm and effort [Exaggerated Use Of Accessory Muscles For Inspiration] : no accessory muscle use [Auscultation Breath Sounds / Voice Sounds] : lungs were clear to auscultation bilaterally [Heart Rate And Rhythm] : heart rate and rhythm were normal [Heart Sounds] : normal S1 and S2 [Murmurs] : no murmurs present [Abdomen Soft] : soft [Abdomen Tenderness] : non-tender [Abdomen Mass (___ Cm)] : no abdominal mass palpated [Abnormal Walk] : normal gait [Gait - Sufficient For Exercise Testing] : the gait was sufficient for exercise testing [Nail Clubbing] : no clubbing of the fingernails [Cyanosis, Localized] : no localized cyanosis [Petechial Hemorrhages (___cm)] : no petechial hemorrhages [] : no ischemic changes [Oriented To Time, Place, And Person] : oriented to person, place, and time [Affect] : the affect was normal [Mood] : the mood was normal [No Anxiety] : not feeling anxious [Arterial Pulses Normal] : the arterial pulses were normal [Edema] : no peripheral edema present

## 2019-11-20 NOTE — HISTORY OF PRESENT ILLNESS
[FreeTextEntry1] : 73-year-old female with HTN, hyperglycemia, kidney stone, hepatitis B carrier, anxiety disorder, presents for followup.  Patient was last seen on 6/19/18.  She takes Metoprolol to 50 mg AM and 25 mg PM along with  Enalapril 10 mg twice daily for HTN.  Patient reports feeling dizzy and fatigue with no strength for the last 2 days.  She was concerned that her blood sugar was abnormally high at 220 this morning.  Patient is otherwise very active; she walks fast and can mow her own lawn.  She felt CP with exertion which she attributes to breathing in cold air.  She also has been experiencing constipation, and has h/o gallstones that is under observation.  \par

## 2019-11-20 NOTE — DISCUSSION/SUMMARY
[FreeTextEntry1] : 73-year-old female with HTN, hyperglycemia, kidney stone, hepatitis B carrier, anxiety disorder, presents for followup.  Patient was last seen on 6/19/18.  She takes Metoprolol to 50 mg AM and 25 mg PM along with  Enalapril 10 mg twice daily for HTN.  Patient reports feeling dizzy and fatigue with no strength for the last 2 days.  She was concerned that her blood sugar was abnormally high at 220 this morning.  Patient is otherwise very active; she walks fast and can mow her own lawn.  She felt CP with exertion which she attributes to breathing in cold air.  She also has been experiencing constipation, and has h/o gallstones that is under observation.  \par \par (1) CP, non-exertional - Patient underwent an echocardiogram and it showed normal LV function without significant valvular pathology. Patient underwent a treadmill stress test and completed 3 minutes of Glenn protocol.  There were upsloping ST depressions on ECG but no symptoms.  Following treadmill stress, there is no echocardiographic evidence of ischemia.  Patient was reassured. \par \par (2) Dizziness - Her symptom is likely due to vestibular dysfunction.  I advised patient to followup with neurologist if her symptom persists.\par \par (3) HTN - Her BP was good today.  She should continue Metoprolol to 50 mg AM and 25 mg PM along with  Enalapril 10 mg twice daily.\par \par (4) Anxiety disorder - She may take Clonazepam as needed.\par \par (5) Followup - 6 months.

## 2019-11-20 NOTE — PHYSICAL EXAM
[General Appearance - Well Developed] : well developed [Normal Appearance] : normal appearance [Well Groomed] : well groomed [General Appearance - Well Nourished] : well nourished [No Deformities] : no deformities [General Appearance - In No Acute Distress] : no acute distress [Normal Conjunctiva] : the conjunctiva exhibited no abnormalities [Eyelids - No Xanthelasma] : the eyelids demonstrated no xanthelasmas [Normal Oral Mucosa] : normal oral mucosa [No Oral Pallor] : no oral pallor [No Oral Cyanosis] : no oral cyanosis [Normal Jugular Venous A Waves Present] : normal jugular venous A waves present [Normal Jugular Venous V Waves Present] : normal jugular venous V waves present [No Jugular Venous Aggarwal A Waves] : no jugular venous aggarwal A waves [Respiration, Rhythm And Depth] : normal respiratory rhythm and effort [Exaggerated Use Of Accessory Muscles For Inspiration] : no accessory muscle use [Auscultation Breath Sounds / Voice Sounds] : lungs were clear to auscultation bilaterally [Heart Rate And Rhythm] : heart rate and rhythm were normal [Heart Sounds] : normal S1 and S2 [Murmurs] : no murmurs present [Arterial Pulses Normal] : the arterial pulses were normal [Edema] : no peripheral edema present [Abdomen Soft] : soft [Abdomen Tenderness] : non-tender [Abdomen Mass (___ Cm)] : no abdominal mass palpated [Abnormal Walk] : normal gait [Gait - Sufficient For Exercise Testing] : the gait was sufficient for exercise testing [Nail Clubbing] : no clubbing of the fingernails [Cyanosis, Localized] : no localized cyanosis [Petechial Hemorrhages (___cm)] : no petechial hemorrhages [] : no ischemic changes [Oriented To Time, Place, And Person] : oriented to person, place, and time [Affect] : the affect was normal [Mood] : the mood was normal [No Anxiety] : not feeling anxious

## 2019-11-21 ENCOUNTER — NON-APPOINTMENT (OUTPATIENT)
Age: 74
End: 2019-11-21

## 2019-11-21 ENCOUNTER — APPOINTMENT (OUTPATIENT)
Dept: CARDIOLOGY | Facility: CLINIC | Age: 74
End: 2019-11-21
Payer: MEDICARE

## 2019-11-21 VITALS
BODY MASS INDEX: 27.07 KG/M2 | TEMPERATURE: 98.1 F | OXYGEN SATURATION: 97 % | HEART RATE: 88 BPM | RESPIRATION RATE: 17 BRPM | WEIGHT: 148 LBS | SYSTOLIC BLOOD PRESSURE: 166 MMHG | DIASTOLIC BLOOD PRESSURE: 74 MMHG

## 2019-11-21 PROCEDURE — 99214 OFFICE O/P EST MOD 30 MIN: CPT

## 2019-11-21 PROCEDURE — 93000 ELECTROCARDIOGRAM COMPLETE: CPT

## 2019-11-22 NOTE — HISTORY OF PRESENT ILLNESS
[FreeTextEntry1] : Patient reports that she fell from stool and fractured several ribs in June. Patient was hospitalized to r/o pneumonia. Patient reports occasional CP when she is tired.  Patient has occasional palpitations lasting seconds. Her BP is 120/80 at home. \par \par 74-year-old female with HTN, hyperglycemia, kidney stone, hepatitis B carrier, anxiety disorder, presents for followup.  Patient was last seen on 1/3/19 for CP and dizziness.  Patient underwent an echocardiogram and it showed normal LV function without significant valvular pathology. Patient underwent a treadmill stress test and completed 3 minutes of Glenn protocol.  There were upsloping ST depressions on ECG but no symptoms.  Following treadmill stress, there is no echocardiographic evidence of ischemia.  Her dizziness was felt to be due to vestibular dysfunction.  I advised patient to followup with neurologist if her symptom persisted.\par \par (1) CP, non-exertional - Patient underwent an echocardiogram and it showed normal LV function without significant valvular pathology. Patient underwent a treadmill stress test and completed 3 minutes of Glenn protocol.  There were upsloping ST depressions on ECG but no symptoms.  Following treadmill stress, there is no echocardiographic evidence of ischemia.  Patient was reassured. \par \par (2) Dizziness - Her symptom is likely due to vestibular dysfunction.  I advised patient to followup with neurologist if her symptom persists.\par \par (3) HTN - Her BP was good today.  She should continue Metoprolol to 50 mg AM and 25 mg PM along with  Enalapril 10 mg twice daily.\par \par (4) Anxiety disorder - She may take Clonazepam as needed.\par \par (5) Followup - 6 months.\par \par 6/19/18.  She takes Metoprolol to 50 mg AM and 25 mg PM along with  Enalapril 10 mg twice daily for HTN.  Patient reports feeling dizzy and fatigue with no strength for the last 2 days.  She was concerned that her blood sugar was abnormally high at 220 this morning.  Patient is otherwise very active; she walks fast and can mow her own lawn.  She felt CP with exertion which she attributes to breathing in cold air.  She also has been experiencing constipation, and has h/o gallstones that is under observation.  \par

## 2020-03-18 ENCOUNTER — RX RENEWAL (OUTPATIENT)
Age: 75
End: 2020-03-18

## 2020-05-04 ENCOUNTER — RX RENEWAL (OUTPATIENT)
Age: 75
End: 2020-05-04

## 2020-07-10 NOTE — ED PROVIDER NOTE - CROS ED RESP ALL NEG
Please call patient to Schedule left adductor canal block with US in office. DX saphenous neuralgia  
- - -

## 2020-11-30 ENCOUNTER — RX RENEWAL (OUTPATIENT)
Age: 75
End: 2020-11-30

## 2021-03-08 ENCOUNTER — RX RENEWAL (OUTPATIENT)
Age: 76
End: 2021-03-08

## 2021-04-21 NOTE — ED PROVIDER NOTE - RESPIRATORY [+], MLM
[address] Patient Status:  Hospital Outpatient Surgery   Age/Gender 76year old male MRN [id number]   Location [id number] [place] [address] Attending Nati Murrell MD   Hosp Day # 0 PCP Andrade Seaman MD, MD Yolanda Mandel [facility]

## 2021-06-01 ENCOUNTER — RX RENEWAL (OUTPATIENT)
Age: 76
End: 2021-06-01

## 2021-06-04 ENCOUNTER — APPOINTMENT (OUTPATIENT)
Dept: CARDIOLOGY | Facility: CLINIC | Age: 76
End: 2021-06-04
Payer: MEDICARE

## 2021-06-04 ENCOUNTER — NON-APPOINTMENT (OUTPATIENT)
Age: 76
End: 2021-06-04

## 2021-06-04 VITALS
BODY MASS INDEX: 26.7 KG/M2 | WEIGHT: 146 LBS | SYSTOLIC BLOOD PRESSURE: 147 MMHG | TEMPERATURE: 98 F | DIASTOLIC BLOOD PRESSURE: 82 MMHG | RESPIRATION RATE: 18 BRPM | OXYGEN SATURATION: 96 % | HEART RATE: 64 BPM

## 2021-06-04 PROCEDURE — 93306 TTE W/DOPPLER COMPLETE: CPT

## 2021-06-04 PROCEDURE — 93000 ELECTROCARDIOGRAM COMPLETE: CPT

## 2021-06-04 PROCEDURE — 99214 OFFICE O/P EST MOD 30 MIN: CPT

## 2021-06-04 PROCEDURE — 99072 ADDL SUPL MATRL&STAF TM PHE: CPT

## 2021-08-12 NOTE — REASON FOR VISIT
[Symptom and Test Evaluation] : symptom and test evaluation [FreeTextEntry1] : 6/4/21 - Patient has been stable.  Patient denies CP, SOB, palpitations, or lightheadedness.  She has trace LE edema.  I advised patient to undergo an echocardiogram.   ECG for HTN.\par \par 11/21/19 - Patient reports that she fell from stool and fractured several ribs in June. Patient was hospitalized to r/o pneumonia. Patient reports occasional CP when she is tired.  Patient has occasional palpitations lasting seconds. Her BP is 120/80 at home.

## 2021-08-12 NOTE — HISTORY OF PRESENT ILLNESS
[FreeTextEntry1] : 76-year-old female with HTN, hyperglycemia, kidney stone, hepatitis B carrier, anxiety disorder, presents for followup.  \par \par Patient was last seen on 11/21/19.  \par \par She is on Metoprolol to 50 mg AM and 25 mg PM along with  Enalapril 10 mg twice daily for HTN.\par \par Last echo was on 1/3/19 and it showed normal LV function without significant valvular pathology. \par \par Last stress was on 1/3/19 and completed 3 minutes of Glenn protocol.  There were upsloping ST depressions on ECG but no symptoms.  Following treadmill stress, there is no echocardiographic evidence of ischemia. \par \par

## 2021-08-26 ENCOUNTER — APPOINTMENT (OUTPATIENT)
Dept: MAMMOGRAPHY | Facility: IMAGING CENTER | Age: 76
End: 2021-08-26
Payer: MEDICARE

## 2021-08-26 ENCOUNTER — APPOINTMENT (OUTPATIENT)
Dept: ULTRASOUND IMAGING | Facility: IMAGING CENTER | Age: 76
End: 2021-08-26
Payer: MEDICARE

## 2021-08-26 ENCOUNTER — APPOINTMENT (OUTPATIENT)
Dept: RADIOLOGY | Facility: IMAGING CENTER | Age: 76
End: 2021-08-26
Payer: MEDICARE

## 2021-08-26 ENCOUNTER — RESULT REVIEW (OUTPATIENT)
Age: 76
End: 2021-08-26

## 2021-08-26 ENCOUNTER — OUTPATIENT (OUTPATIENT)
Dept: OUTPATIENT SERVICES | Facility: HOSPITAL | Age: 76
LOS: 1 days | End: 2021-08-26
Payer: MEDICARE

## 2021-08-26 DIAGNOSIS — Z00.8 ENCOUNTER FOR OTHER GENERAL EXAMINATION: ICD-10-CM

## 2021-08-26 PROCEDURE — 71046 X-RAY EXAM CHEST 2 VIEWS: CPT

## 2021-08-26 PROCEDURE — 77067 SCR MAMMO BI INCL CAD: CPT

## 2021-08-26 PROCEDURE — 77063 BREAST TOMOSYNTHESIS BI: CPT

## 2021-08-26 PROCEDURE — 71046 X-RAY EXAM CHEST 2 VIEWS: CPT | Mod: 26

## 2021-08-26 PROCEDURE — 77080 DXA BONE DENSITY AXIAL: CPT | Mod: 26

## 2021-08-26 PROCEDURE — 77080 DXA BONE DENSITY AXIAL: CPT

## 2021-08-26 PROCEDURE — 77063 BREAST TOMOSYNTHESIS BI: CPT | Mod: 26

## 2021-08-26 PROCEDURE — 76641 ULTRASOUND BREAST COMPLETE: CPT | Mod: 26,50

## 2021-08-26 PROCEDURE — 77067 SCR MAMMO BI INCL CAD: CPT | Mod: 26

## 2021-08-26 PROCEDURE — 76641 ULTRASOUND BREAST COMPLETE: CPT

## 2021-12-08 ENCOUNTER — RX RENEWAL (OUTPATIENT)
Age: 76
End: 2021-12-08

## 2021-12-13 ENCOUNTER — APPOINTMENT (OUTPATIENT)
Dept: CARDIOLOGY | Facility: CLINIC | Age: 76
End: 2021-12-13
Payer: MEDICARE

## 2021-12-13 VITALS
TEMPERATURE: 97.8 F | HEART RATE: 60 BPM | RESPIRATION RATE: 18 BRPM | DIASTOLIC BLOOD PRESSURE: 85 MMHG | OXYGEN SATURATION: 95 % | WEIGHT: 149 LBS | BODY MASS INDEX: 27.25 KG/M2 | SYSTOLIC BLOOD PRESSURE: 149 MMHG

## 2021-12-13 DIAGNOSIS — R25.2 CRAMP AND SPASM: ICD-10-CM

## 2021-12-13 PROCEDURE — 99214 OFFICE O/P EST MOD 30 MIN: CPT

## 2021-12-25 PROBLEM — R25.2 LEG CRAMPS: Status: ACTIVE | Noted: 2021-12-13

## 2022-01-23 ENCOUNTER — NON-APPOINTMENT (OUTPATIENT)
Age: 77
End: 2022-01-23

## 2022-01-25 ENCOUNTER — NON-APPOINTMENT (OUTPATIENT)
Age: 77
End: 2022-01-25

## 2022-01-25 ENCOUNTER — APPOINTMENT (OUTPATIENT)
Dept: OPHTHALMOLOGY | Facility: CLINIC | Age: 77
End: 2022-01-25
Payer: MEDICARE

## 2022-01-25 PROCEDURE — 92004 COMPRE OPH EXAM NEW PT 1/>: CPT

## 2022-01-25 PROCEDURE — 92250 FUNDUS PHOTOGRAPHY W/I&R: CPT

## 2022-01-25 PROCEDURE — 92285 EXTERNAL OCULAR PHOTOGRAPHY: CPT

## 2022-03-01 NOTE — REASON FOR VISIT
[Symptom and Test Evaluation] : symptom and test evaluation [FreeTextEntry1] : 76-year-old female with HTN, hyperglycemia, kidney stone, hepatitis B carrier, anxiety disorder, presents for followup.  \par \par Patient was last seen on 6/4/21.  I advised patient to undergo an echocardiogram.   \par \par She is on Metoprolol to 50 mg AM and 25 mg PM along with  Enalapril 10 mg twice daily for HTN.\par \par Last echo was on 1/3/19 and it showed normal LV function without significant valvular pathology. \par \par Last stress was on 1/3/19 and completed 3 minutes of Glenn protocol.  There were upsloping ST depressions on ECG but no symptoms.  Following treadmill stress, there is no echocardiographic evidence of ischemia. \par \par

## 2022-03-01 NOTE — HISTORY OF PRESENT ILLNESS
[FreeTextEntry1] : 12/13/21 - Patient was diagnosed with glaucoma but she does not feel comfortable with the eyedrops and would like to get second opinion, Patient reports osteoarthritis with knee pain. Patient also reports getting leg cramps last night while cooking in kitchen. I advised patient to supplement with magnesium. \par \par \par 6/4/21 - Patient has been stable.  Patient denies CP, SOB, palpitations, or lightheadedness.  She has trace LE edema.  I advised patient to undergo an echocardiogram.   ECG for HTN.\par \par 11/21/19 - Patient reports that she fell from stool and fractured several ribs in June. Patient was hospitalized to r/o pneumonia. Patient reports occasional CP when she is tired.  Patient has occasional palpitations lasting seconds. Her BP is 120/80 at home.

## 2022-03-08 ENCOUNTER — APPOINTMENT (OUTPATIENT)
Dept: OPHTHALMOLOGY | Facility: CLINIC | Age: 77
End: 2022-03-08

## 2022-03-13 ENCOUNTER — RX RENEWAL (OUTPATIENT)
Age: 77
End: 2022-03-13

## 2022-03-21 ENCOUNTER — APPOINTMENT (OUTPATIENT)
Dept: CARDIOLOGY | Facility: CLINIC | Age: 77
End: 2022-03-21

## 2022-03-21 ENCOUNTER — NON-APPOINTMENT (OUTPATIENT)
Age: 77
End: 2022-03-21

## 2022-03-21 ENCOUNTER — APPOINTMENT (OUTPATIENT)
Dept: CARDIOLOGY | Facility: CLINIC | Age: 77
End: 2022-03-21
Payer: MEDICARE

## 2022-03-21 VITALS
HEART RATE: 75 BPM | DIASTOLIC BLOOD PRESSURE: 82 MMHG | OXYGEN SATURATION: 95 % | TEMPERATURE: 97.6 F | BODY MASS INDEX: 27.98 KG/M2 | WEIGHT: 153 LBS | SYSTOLIC BLOOD PRESSURE: 128 MMHG | RESPIRATION RATE: 18 BRPM

## 2022-03-21 PROCEDURE — 99214 OFFICE O/P EST MOD 30 MIN: CPT

## 2022-03-21 PROCEDURE — 93000 ELECTROCARDIOGRAM COMPLETE: CPT

## 2022-03-21 RX ORDER — UBIDECARENONE 100 MG
100 CAPSULE ORAL
Qty: 30 | Refills: 5 | Status: ACTIVE | COMMUNITY
Start: 2022-03-21 | End: 1900-01-01

## 2022-03-21 NOTE — PHYSICAL EXAM
[Normal Appearance] : normal appearance [General Appearance - Well Developed] : well developed [Well Groomed] : well groomed [General Appearance - Well Nourished] : well nourished [No Deformities] : no deformities [General Appearance - In No Acute Distress] : no acute distress [Normal Conjunctiva] : the conjunctiva exhibited no abnormalities [Eyelids - No Xanthelasma] : the eyelids demonstrated no xanthelasmas [Normal Oral Mucosa] : normal oral mucosa [No Oral Pallor] : no oral pallor [No Oral Cyanosis] : no oral cyanosis [Normal Jugular Venous A Waves Present] : normal jugular venous A waves present [Normal Jugular Venous V Waves Present] : normal jugular venous V waves present [No Jugular Venous Aggarwal A Waves] : no jugular venous aggarwal A waves [Respiration, Rhythm And Depth] : normal respiratory rhythm and effort [Exaggerated Use Of Accessory Muscles For Inspiration] : no accessory muscle use [Auscultation Breath Sounds / Voice Sounds] : lungs were clear to auscultation bilaterally [Heart Rate And Rhythm] : heart rate and rhythm were normal [Heart Sounds] : normal S1 and S2 [Murmurs] : no murmurs present [Arterial Pulses Normal] : the arterial pulses were normal [Edema] : no peripheral edema present [Abdomen Soft] : soft [Abdomen Tenderness] : non-tender [Abdomen Mass (___ Cm)] : no abdominal mass palpated [Gait - Sufficient For Exercise Testing] : the gait was sufficient for exercise testing [Abnormal Walk] : normal gait [Nail Clubbing] : no clubbing of the fingernails [Cyanosis, Localized] : no localized cyanosis [Petechial Hemorrhages (___cm)] : no petechial hemorrhages [] : no ischemic changes [Oriented To Time, Place, And Person] : oriented to person, place, and time [Affect] : the affect was normal [Mood] : the mood was normal [No Anxiety] : not feeling anxious

## 2022-03-22 NOTE — HISTORY OF PRESENT ILLNESS
[FreeTextEntry1] : 3/21/22 - Patient reports L sided discomfort focally  from palpitations lasting seconds. Patient denies CP. Patient denies SOB. She would feel it at night during her sleep. I advised patient to wear 7 day zio patch. \par \par 12/13/21 - Patient was diagnosed with glaucoma but she does not feel comfortable with the eyedrops and would like to get second opinion, Patient reports osteoarthritis with knee pain. Patient also reports getting leg cramps last night while cooking in kitchen. I advised patient to supplement with magnesium. \par \par 6/4/21 - Patient has been stable.  Patient denies CP, SOB, palpitations, or lightheadedness.  She has trace LE edema.  I advised patient to undergo an echocardiogram.   ECG for HTN.\par \par 11/21/19 - Patient reports that she fell from stool and fractured several ribs in June. Patient was hospitalized to r/o pneumonia. Patient reports occasional CP when she is tired.  Patient has occasional palpitations lasting seconds. Her BP is 120/80 at home.

## 2022-03-22 NOTE — REASON FOR VISIT
[FreeTextEntry1] : 76-year-old female with HTN, hyperglycemia, kidney stone, hepatitis B carrier, anxiety disorder, presents for followup.  \par \par Patient was last seen on 12/13/21 for followup.  Patient reported leg cramps. I advised patient to supplement with magnesium.  \par \par She is on Metoprolol to 50 mg AM and 25 mg PM along with  Enalapril 10 mg twice daily for HTN.\par \par Last echo was on 1/3/19 and it showed normal LV function without significant valvular pathology. \par \par Last stress was on 1/3/19 and completed 3 minutes of Glenn protocol.  There were upsloping ST depressions on ECG but no symptoms.  Following treadmill stress, there is no echocardiographic evidence of ischemia. \par \par

## 2022-03-29 ENCOUNTER — FORM ENCOUNTER (OUTPATIENT)
Age: 77
End: 2022-03-29

## 2022-04-05 ENCOUNTER — NON-APPOINTMENT (OUTPATIENT)
Age: 77
End: 2022-04-05

## 2022-05-05 ENCOUNTER — APPOINTMENT (OUTPATIENT)
Dept: FAMILY MEDICINE | Facility: CLINIC | Age: 77
End: 2022-05-05

## 2022-05-09 ENCOUNTER — RESULT REVIEW (OUTPATIENT)
Age: 77
End: 2022-05-09

## 2022-10-13 ENCOUNTER — APPOINTMENT (OUTPATIENT)
Dept: CARDIOLOGY | Facility: CLINIC | Age: 77
End: 2022-10-13

## 2022-10-13 ENCOUNTER — NON-APPOINTMENT (OUTPATIENT)
Age: 77
End: 2022-10-13

## 2022-10-13 VITALS
TEMPERATURE: 97.8 F | WEIGHT: 154 LBS | BODY MASS INDEX: 28.17 KG/M2 | RESPIRATION RATE: 18 BRPM | HEART RATE: 74 BPM | OXYGEN SATURATION: 98 % | SYSTOLIC BLOOD PRESSURE: 187 MMHG | DIASTOLIC BLOOD PRESSURE: 85 MMHG

## 2022-10-13 PROCEDURE — 93000 ELECTROCARDIOGRAM COMPLETE: CPT

## 2022-10-13 PROCEDURE — 99214 OFFICE O/P EST MOD 30 MIN: CPT | Mod: 25

## 2022-10-18 ENCOUNTER — APPOINTMENT (OUTPATIENT)
Dept: CARDIOLOGY | Facility: CLINIC | Age: 77
End: 2022-10-18

## 2022-10-18 PROCEDURE — A9500: CPT

## 2022-10-18 PROCEDURE — 78452 HT MUSCLE IMAGE SPECT MULT: CPT

## 2022-10-18 PROCEDURE — 93015 CV STRESS TEST SUPVJ I&R: CPT

## 2022-10-25 ENCOUNTER — APPOINTMENT (OUTPATIENT)
Dept: CARDIOLOGY | Facility: CLINIC | Age: 77
End: 2022-10-25

## 2022-10-25 ENCOUNTER — NON-APPOINTMENT (OUTPATIENT)
Age: 77
End: 2022-10-25

## 2022-10-25 VITALS
TEMPERATURE: 97.8 F | WEIGHT: 153 LBS | SYSTOLIC BLOOD PRESSURE: 170 MMHG | DIASTOLIC BLOOD PRESSURE: 94 MMHG | HEART RATE: 80 BPM | OXYGEN SATURATION: 95 % | BODY MASS INDEX: 27.98 KG/M2 | RESPIRATION RATE: 18 BRPM

## 2022-10-25 DIAGNOSIS — R07.89 OTHER CHEST PAIN: ICD-10-CM

## 2022-10-25 PROCEDURE — 99214 OFFICE O/P EST MOD 30 MIN: CPT | Mod: 25

## 2022-10-25 PROCEDURE — 93306 TTE W/DOPPLER COMPLETE: CPT

## 2022-10-25 PROCEDURE — 93000 ELECTROCARDIOGRAM COMPLETE: CPT

## 2022-10-25 RX ORDER — NITROGLYCERIN 0.4 MG/1
0.4 TABLET SUBLINGUAL
Qty: 1 | Refills: 0 | Status: ACTIVE | COMMUNITY
Start: 2022-10-25 | End: 1900-01-01

## 2022-10-25 NOTE — HISTORY OF PRESENT ILLNESS
[FreeTextEntry1] : 10/13/22 - Patient reports left-sided CP yesterday but OK today.  Patient reports palpitations lasting seconds similar to March.  Her BP was elevated.  I advised patient to start Amlodipine 2.5 mg QD.  I advised patient to undergo a pharmacologic nuclear stress test. Patient underwent a pharmacologic nuclear stress test and it showed mild RCA/LCx ischemia.  I advised patient to start ASA 81 mg for now.  Her LDL is <100 so will hold off statin for now.\par \par \par 3/21/22 - Patient reports L sided discomfort focally from palpitations lasting seconds.  Patient denies CP. Patient denies SOB. She would feel it at night during her sleep. I advised patient to wear 7 day zio patch and it showed No A. Fib. Average HR 65, rare PVCs and couplets, rare PACs, couplets and triplets.. \par \par 12/13/21 - Patient was diagnosed with glaucoma but she does not feel comfortable with the eyedrops and would like to get second opinion, Patient reports osteoarthritis with knee pain. Patient also reports getting leg cramps last night while cooking in kitchen. I advised patient to supplement with magnesium. \par \par 6/4/21 - Patient has been stable.  Patient denies CP, SOB, palpitations, or lightheadedness.  She has trace LE edema.  I advised patient to undergo an echocardiogram.   ECG for HTN.\par \par 11/21/19 - Patient reports that she fell from stool and fractured several ribs in June. Patient was hospitalized to r/o pneumonia. Patient reports occasional CP when she is tired.  Patient has occasional palpitations lasting seconds. Her BP is 120/80 at home.

## 2022-10-25 NOTE — REASON FOR VISIT
[FreeTextEntry1] : 76-year-old female with HTN, hyperglycemia, kidney stone, hepatitis B carrier, anxiety disorder, presents for followup.  \par \par Patient was last seen on 10/13/22 for left-sided CP.   Patient reported palpitations lasting seconds similar to March.  Her BP was elevated.  I advised patient to start Amlodipine 2.5 mg QD.  Patient underwent a pharmacologic nuclear stress test and it showed mild RCA/LCx ischemia.  I advised patient to start ASA 81 mg for now.  Her LDL is <100 so will hold off statin for now.\par \par She is on ASA 81 mg for CAD.  She is on Amlodipine 2.5 mg, Metoprolol to 50 mg AM and 25 mg PM along with  Enalapril 10 mg twice daily for HTN.\par \par Patient wore a 7 day zio patch 3/21/22 and it showed No A. Fib. Average HR 65, rare PVCs and couplets, rare PACs, couplets and triplets.. \par \par Echo on 1/3/19  showed normal LV function without significant valvular pathology. \par \par treadmill stress test on 1/3/19 and completed 3 minutes of Glenn protocol.  There were upsloping ST depressions on ECG but no symptoms.  Following treadmill stress, there is no echocardiographic evidence of ischemia. \par \par

## 2022-10-27 NOTE — HISTORY OF PRESENT ILLNESS
[FreeTextEntry1] : 10/13/22- Patient reports that she became really upset with her  and her BP became really elevated and she also had palpitations with cough and felt sick. Patient has been feeling depressed. Patient also reports L sided CP and is better today.  Patient reports that after taking CoQ10 she has less palpitations. I advised patient to take Amlodipine 2.5 mg when her BP is elevated at any time. I advised patient to undergo an echocardiogram and a treadmill stress test. \par \par 3/21/22 - Patient reports L sided discomfort focally  from palpitations lasting seconds. Patient denies CP. Patient denies SOB. She would feel it at night during her sleep. I advised patient to wear 7 day zio patch and it showed No A. Fib. Average HR 65, rare PVCs and couplets, rare PACs, couplets and triplets.. \par \par 12/13/21 - Patient was diagnosed with glaucoma but she does not feel comfortable with the eyedrops and would like to get second opinion, Patient reports osteoarthritis with knee pain. Patient also reports getting leg cramps last night while cooking in kitchen. I advised patient to supplement with magnesium. \par \par 6/4/21 - Patient has been stable.  Patient denies CP, SOB, palpitations, or lightheadedness.  She has trace LE edema.  I advised patient to undergo an echocardiogram.   ECG for HTN.\par \par 11/21/19 - Patient reports that she fell from stool and fractured several ribs in June. Patient was hospitalized to r/o pneumonia. Patient reports occasional CP when she is tired.  Patient has occasional palpitations lasting seconds. Her BP is 120/80 at home.

## 2022-10-27 NOTE — REASON FOR VISIT
[FreeTextEntry1] : 76-year-old female with HTN, hyperglycemia, kidney stone, hepatitis B carrier, anxiety disorder, presents for followup.  \par \par Patient was last seen on 3/21/22 for L sided discomfort and palpitations lasting seconds.   I advised patient to wear 7 day zio patch and it showed No A. Fib. Average HR 65, rare PVCs and couplets, rare PACs, couplets and triplets.. \par \par She is on Metoprolol to 50 mg AM and 25 mg PM along with  Enalapril 10 mg twice daily for HTN.\par \par Last echo was on 1/3/19 and it showed normal LV function without significant valvular pathology. \par \par Last stress was on 1/3/19 and completed 3 minutes of Glenn protocol.  There were upsloping ST depressions on ECG but no symptoms.  Following treadmill stress, there is no echocardiographic evidence of ischemia. \par \par

## 2022-11-11 ENCOUNTER — NON-APPOINTMENT (OUTPATIENT)
Age: 77
End: 2022-11-11

## 2022-12-05 ENCOUNTER — RX RENEWAL (OUTPATIENT)
Age: 77
End: 2022-12-05

## 2022-12-23 ENCOUNTER — RX RENEWAL (OUTPATIENT)
Age: 77
End: 2022-12-23

## 2022-12-23 ENCOUNTER — APPOINTMENT (OUTPATIENT)
Dept: CARDIOLOGY | Facility: CLINIC | Age: 77
End: 2022-12-23

## 2022-12-23 RX ORDER — METOPROLOL TARTRATE 25 MG/1
25 TABLET, FILM COATED ORAL
Qty: 270 | Refills: 1 | Status: DISCONTINUED | COMMUNITY
Start: 2022-12-23 | End: 2022-12-23

## 2023-01-23 NOTE — ED ADULT NURSE NOTE - NS_NURSE_TRANSFERRED_ED_ALL_ED_DT
New Patient Evaluation Clinic    This is an initial consultation for this 24 year old female seen today for right knee pain.  Goes back about 2 years.  Initially attributed to playing soccer, kicked wall and felt an acute sharp pain laterally and posterolaterally.  She is had exercise and activity related pain since that time is limited her ability to exercise and workout.  Gets some activity-related swelling and sharp pains laterally and posterolaterally.  No specific treatment.  Has taken occasional anti-inflammatories, really limited her activities.    PAST MEDICAL HISTORY:    High cholesterol                                              Depressive disorder                                           PAST SURGICAL HISTORY:    TONSILLECTOMY                                                 CURRENT MEDICATIONS:  No current outpatient medications on file.     No current facility-administered medications for this visit.       ALLERGIES:  ALLERGIES:  No Known Allergies    SOCIAL HISTORY:  Social History     Tobacco Use   • Smoking status: Never   • Smokeless tobacco: Never   Substance Use Topics   • Alcohol use: Yes     Comment: OCC   • Drug use: No       REVIEW OF SYSTEMS:  I have reviewed the review of systems by the medical assistant and concur with those findings.    PHYSICIAL EXAMINATION:  Vital Signs: There were no vitals taken for this visit.  General:  The patient is alert and oriented times 3. She is in no acute distress. She is well groomed and cooperative with the exam. Mood and affect are appropriate.   Musculoskeletal:  In no distress, nonantalgic gait.  Knee shows no effusion.  Somewhat guarded but no gross varus valgus instability.  Against somewhat guarded but seems to have solid endpoint Lachman's test.  Mild-to-moderate lateral posterolateral joint line tenderness and tenderness within the popliteal fossa.  No palpable cyst.  Appropriate full strength knee extension flexion.  No pain with hip log roll.   Negative nerve root tension signs.  Mild tenderness around the lateral hamstrings, no contour abnormality or palpable abnormality of the hamstring tendon    IMAGING:  Knee series is unremarkable    IMPRESSION:  Persistent right knee pain, most consistent with soft tissue hamstring etiology, however given persistence of symptoms following traumatic onset, discussed possibility of meniscal tear being another reasonable consideration    PLAN:  In any case given the persistence of symptoms, limitations, young age activity demand level, we will proceed with MRI at this point further characterize primarily evaluate for lateral meniscus tear.  Arrangements made.  Will be in touch once MRI is complete to discuss findings and options going forward.   17-Jun-2019 05:48

## 2023-02-16 ENCOUNTER — APPOINTMENT (OUTPATIENT)
Dept: CARDIOLOGY | Facility: CLINIC | Age: 78
End: 2023-02-16
Payer: MEDICARE

## 2023-02-16 VITALS
SYSTOLIC BLOOD PRESSURE: 148 MMHG | OXYGEN SATURATION: 97 % | HEART RATE: 66 BPM | TEMPERATURE: 97.9 F | DIASTOLIC BLOOD PRESSURE: 81 MMHG | RESPIRATION RATE: 18 BRPM | WEIGHT: 154 LBS | BODY MASS INDEX: 28.17 KG/M2

## 2023-02-16 PROCEDURE — 99213 OFFICE O/P EST LOW 20 MIN: CPT

## 2023-02-16 NOTE — REASON FOR VISIT
[FreeTextEntry1] : 77-year-old female with HTN, hyperglycemia, kidney stone, hepatitis B carrier, anxiety disorder, presents for followup.  \par \par Patient was last seen on 10/13/22 for left-sided CP.   Patient reported palpitations lasting seconds similar to March.  Her BP was elevated.  I advised patient to start Amlodipine 2.5 mg QD.  10/25/22 - Patient reports palpitations for 1 week since starting Amlodipine.  I advised patient to undergo an echocardiogram.  Patient underwent an echocardiogram and it showed normal LV function without significant valvular pathology.  I advised patient to stop Amlodipine.  I advised patient to increase Metoprolol to 50 mg BD.  \par \par She is on ASA 81 mg for CAD.  She is on Metoprolol 50 mg BID and  Enalapril 10 mg BID for HTN.\par \par Patient underwent a pharmacologic nuclear stress test 10/18/22 and it showed mild RCA/LCx ischemia.  I advised patient to start ASA 81 mg for now.  Her LDL is <100 so will hold off statin for now.\par \par Patient wore a 7 day zio patch 3/21/22 and it showed No A. Fib. Average HR 65, rare PVCs and couplets, rare PACs, couplets and triplets.. \par \par Echo on 1/3/19  showed normal LV function without significant valvular pathology. \par \par treadmill stress test on 1/3/19 and completed 3 minutes of Glenn protocol.  There were upsloping ST depressions on ECG but no symptoms.  Following treadmill stress, there is no echocardiographic evidence of ischemia. \par \par

## 2023-02-16 NOTE — HISTORY OF PRESENT ILLNESS
[FreeTextEntry1] : 2/16/23 - Pt reports tiredness. Her BP sometimes 90s/60s. She is on Metoprolol ER 50 mg BID, Enalapril 10 mg BID, Amlodipine 2.5 mg, and ASA 81 mg. Pt reports occasional palpitations. Pt denies CP and SOB. Pt reports B/L forearm swelling sometimes. Fu in 3 months. \par \par 10/25/22 - Patient reports palpitations for 1 week since starting Amlodipine.  I advised patient to undergo an echocardiogram.  Patient underwent an echocardiogram and it showed normal LV function without significant valvular pathology.  I advised patient to stop Amlodipine.  I advised patient to increase Metoprolol to 50 mg BD.  \par \par 10/13/22 - Patient reports left-sided CP yesterday but OK today.  Patient reports palpitations lasting seconds similar to March.  Her BP was elevated.  I advised patient to start Amlodipine 2.5 mg QD.  I advised patient to undergo a pharmacologic nuclear stress test. Patient underwent a pharmacologic nuclear stress test and it showed mild RCA/LCx ischemia.  I advised patient to start ASA 81 mg for now.  Her LDL is <100 so will hold off statin for now.\par \par 3/21/22 - Patient reports L sided discomfort focally from palpitations lasting seconds.  Patient denies CP. Patient denies SOB. She would feel it at night during her sleep. I advised patient to wear 7 day zio patch and it showed No A. Fib. Average HR 65, rare PVCs and couplets, rare PACs, couplets and triplets.. \par \par 12/13/21 - Patient was diagnosed with glaucoma but she does not feel comfortable with the eyedrops and would like to get second opinion, Patient reports osteoarthritis with knee pain. Patient also reports getting leg cramps last night while cooking in kitchen. I advised patient to supplement with magnesium. \par \par 6/4/21 - Patient has been stable.  Patient denies CP, SOB, palpitations, or lightheadedness.  She has trace LE edema.  I advised patient to undergo an echocardiogram.   ECG for HTN.\par \par 11/21/19 - Patient reports that she fell from stool and fractured several ribs in June. Patient was hospitalized to r/o pneumonia. Patient reports occasional CP when she is tired.  Patient has occasional palpitations lasting seconds. Her BP is 120/80 at home.

## 2023-05-02 ENCOUNTER — NON-APPOINTMENT (OUTPATIENT)
Age: 78
End: 2023-05-02

## 2023-05-03 ENCOUNTER — NON-APPOINTMENT (OUTPATIENT)
Age: 78
End: 2023-05-03

## 2023-05-04 ENCOUNTER — APPOINTMENT (OUTPATIENT)
Dept: UROLOGY | Facility: CLINIC | Age: 78
End: 2023-05-04
Payer: MEDICARE

## 2023-05-04 ENCOUNTER — OUTPATIENT (OUTPATIENT)
Dept: OUTPATIENT SERVICES | Facility: HOSPITAL | Age: 78
LOS: 1 days | End: 2023-05-04
Payer: MEDICARE

## 2023-05-04 ENCOUNTER — APPOINTMENT (OUTPATIENT)
Dept: CT IMAGING | Facility: IMAGING CENTER | Age: 78
End: 2023-05-04
Payer: MEDICARE

## 2023-05-04 ENCOUNTER — NON-APPOINTMENT (OUTPATIENT)
Age: 78
End: 2023-05-04

## 2023-05-04 ENCOUNTER — RESULT REVIEW (OUTPATIENT)
Age: 78
End: 2023-05-04

## 2023-05-04 VITALS
SYSTOLIC BLOOD PRESSURE: 146 MMHG | WEIGHT: 156 LBS | HEIGHT: 62 IN | BODY MASS INDEX: 28.71 KG/M2 | HEART RATE: 71 BPM | OXYGEN SATURATION: 96 % | DIASTOLIC BLOOD PRESSURE: 86 MMHG

## 2023-05-04 DIAGNOSIS — N20.0 CALCULUS OF KIDNEY: ICD-10-CM

## 2023-05-04 DIAGNOSIS — R10.9 UNSPECIFIED ABDOMINAL PAIN: ICD-10-CM

## 2023-05-04 DIAGNOSIS — Z00.00 ENCOUNTER FOR GENERAL ADULT MEDICAL EXAMINATION W/OUT ABNORMAL FINDINGS: ICD-10-CM

## 2023-05-04 PROCEDURE — 74176 CT ABD & PELVIS W/O CONTRAST: CPT

## 2023-05-04 PROCEDURE — 99204 OFFICE O/P NEW MOD 45 MIN: CPT | Mod: 25

## 2023-05-04 PROCEDURE — 76775 US EXAM ABDO BACK WALL LIM: CPT | Mod: 26

## 2023-05-04 PROCEDURE — 74176 CT ABD & PELVIS W/O CONTRAST: CPT | Mod: 26

## 2023-05-04 NOTE — LETTER BODY
[FreeTextEntry1] : \par Rick Mccormack MD\par [address]\[place] [address]\par P [phone]\par F [phone]\par \par Abel Hunter MD\par [address]\[place] [address]\par P [phone]\par F [phone]\par \par Dear Dr. Mccormack and Dr. Hunter,\par \par \par Reason for visit: Kidney stones.  Left flank and abdominal pain. \par \par This is a 77 year-old woman with left flank abdominal discomfort.  Patient has a long history of kidney stone.  She had surgery in 2012 and 2016 for left kidney stone surgery.  Patient recently complained of left flank pain.  Patient is referred for evaluation of his condition.  The pain is intermittent.  The discomfort is mainly on the left flank.  Patient had an unremarkable renal MRI last year.  Patient denies any gross hematuria or dysuria or urinary incontinence. The patient denies any aggravating or relieving factors. The patient denies any interference of function. The patient is entirely asymptomatic. All other review of systems are negative. Past medical history, family history and social history were inquired and were noncontributory to current condition. Patient [denies tobacco use]. Medications and allergies were reviewed.  Patient is coming by her daughter today.\par \par On examination, the patient is a older appearing woman in no acute distress. He is alert and oriented follows commands. He has normal mood and affect. He is normocephalic. Oral no thrush. Neck is supple. Respirations are unlabored. His abdomen is soft and nontender. Liver is nonpalpable. Bladder is nonpalpable. No CVA tenderness. Neurologically he is grossly intact. No peripheral edema. Skin without gross abnormality. \par \par Assessment: Left flank pain.\par \par I counseled the patient. I discussed the various etiologies of her  symptoms.  Patient has long history of kidney stone.  I encouraged her to obtain a renal ultrasound and bladder ultrasound to evaluate for stone burden.. Risks and alternatives were discussed. I answered the patient questions. The patient will follow-up as directed and will contact me with any questions or concerns. Thank you for the opportunity to participate in the care of this patient. I'll keep you updated on his progress.\par \par Plan: Renal ultrasound.  BMP.  Urinalysis.  \par \par I personally reviewed the ultrasound scan with patient today. Ultrasound demonstrated no hydronephrosis to explain her left flank pain.\par \par I recommend she obtain a noncontrast CT scan to evaluate for other nonurologic etiology of her left flank pain.\par \par Plan: Noncontrast CT scan.\par \par

## 2023-05-05 LAB
ANION GAP SERPL CALC-SCNC: 11 MMOL/L
APPEARANCE: CLEAR
BACTERIA: NEGATIVE /HPF
BILIRUBIN URINE: NEGATIVE
BLOOD URINE: ABNORMAL
BUN SERPL-MCNC: 22 MG/DL
CALCIUM SERPL-MCNC: 9.6 MG/DL
CAST: 0 /LPF
CHLORIDE SERPL-SCNC: 106 MMOL/L
CO2 SERPL-SCNC: 27 MMOL/L
COLOR: YELLOW
CREAT SERPL-MCNC: 0.74 MG/DL
EGFR: 83 ML/MIN/1.73M2
EPITHELIAL CELLS: 6 /HPF
GLUCOSE QUALITATIVE U: 100 MG/DL
GLUCOSE SERPL-MCNC: 178 MG/DL
KETONES URINE: NEGATIVE MG/DL
LEUKOCYTE ESTERASE URINE: ABNORMAL
MICROSCOPIC-UA: NORMAL
NITRITE URINE: NEGATIVE
PH URINE: 6
POTASSIUM SERPL-SCNC: 4.5 MMOL/L
PROTEIN URINE: NEGATIVE MG/DL
RED BLOOD CELLS URINE: 0 /HPF
SODIUM SERPL-SCNC: 143 MMOL/L
SPECIFIC GRAVITY URINE: 1.02
UROBILINOGEN URINE: 0.2 MG/DL
WHITE BLOOD CELLS URINE: 4 /HPF

## 2023-05-06 ENCOUNTER — EMERGENCY (EMERGENCY)
Facility: HOSPITAL | Age: 78
LOS: 1 days | Discharge: HOME | End: 2023-05-06
Attending: STUDENT IN AN ORGANIZED HEALTH CARE EDUCATION/TRAINING PROGRAM | Admitting: STUDENT IN AN ORGANIZED HEALTH CARE EDUCATION/TRAINING PROGRAM
Payer: MEDICARE

## 2023-05-06 VITALS
HEART RATE: 72 BPM | TEMPERATURE: 98 F | DIASTOLIC BLOOD PRESSURE: 94 MMHG | SYSTOLIC BLOOD PRESSURE: 172 MMHG | OXYGEN SATURATION: 100 % | RESPIRATION RATE: 18 BRPM

## 2023-05-06 VITALS
RESPIRATION RATE: 18 BRPM | DIASTOLIC BLOOD PRESSURE: 90 MMHG | SYSTOLIC BLOOD PRESSURE: 165 MMHG | TEMPERATURE: 98 F | HEART RATE: 83 BPM | OXYGEN SATURATION: 100 %

## 2023-05-06 LAB
ALBUMIN SERPL ELPH-MCNC: 4.3 G/DL — SIGNIFICANT CHANGE UP (ref 3.3–5)
ALP SERPL-CCNC: 93 U/L — SIGNIFICANT CHANGE UP (ref 40–120)
ALT FLD-CCNC: 13 U/L — SIGNIFICANT CHANGE UP (ref 4–33)
ANION GAP SERPL CALC-SCNC: 12 MMOL/L — SIGNIFICANT CHANGE UP (ref 7–14)
APPEARANCE UR: CLEAR — SIGNIFICANT CHANGE UP
APTT BLD: 29.4 SEC — SIGNIFICANT CHANGE UP (ref 27–36.3)
AST SERPL-CCNC: 14 U/L — SIGNIFICANT CHANGE UP (ref 4–32)
BACTERIA # UR AUTO: ABNORMAL
BACTERIA UR CULT: NORMAL
BASE EXCESS BLDV CALC-SCNC: 5.5 MMOL/L — HIGH (ref -2–3)
BASOPHILS # BLD AUTO: 0.04 K/UL — SIGNIFICANT CHANGE UP (ref 0–0.2)
BASOPHILS NFR BLD AUTO: 0.8 % — SIGNIFICANT CHANGE UP (ref 0–2)
BILIRUB SERPL-MCNC: 0.2 MG/DL — SIGNIFICANT CHANGE UP (ref 0.2–1.2)
BILIRUB UR-MCNC: NEGATIVE — SIGNIFICANT CHANGE UP
BLD GP AB SCN SERPL QL: NEGATIVE — SIGNIFICANT CHANGE UP
BLOOD GAS VENOUS COMPREHENSIVE RESULT: SIGNIFICANT CHANGE UP
BUN SERPL-MCNC: 25 MG/DL — HIGH (ref 7–23)
CALCIUM SERPL-MCNC: 9.3 MG/DL — SIGNIFICANT CHANGE UP (ref 8.4–10.5)
CHLORIDE BLDV-SCNC: 106 MMOL/L — SIGNIFICANT CHANGE UP (ref 96–108)
CHLORIDE SERPL-SCNC: 106 MMOL/L — SIGNIFICANT CHANGE UP (ref 98–107)
CO2 BLDV-SCNC: 32.6 MMOL/L — HIGH (ref 22–26)
CO2 SERPL-SCNC: 26 MMOL/L — SIGNIFICANT CHANGE UP (ref 22–31)
COLOR SPEC: SIGNIFICANT CHANGE UP
CREAT SERPL-MCNC: 0.95 MG/DL — SIGNIFICANT CHANGE UP (ref 0.5–1.3)
DIFF PNL FLD: NEGATIVE — SIGNIFICANT CHANGE UP
EGFR: 61 ML/MIN/1.73M2 — SIGNIFICANT CHANGE UP
EOSINOPHIL # BLD AUTO: 0.08 K/UL — SIGNIFICANT CHANGE UP (ref 0–0.5)
EOSINOPHIL NFR BLD AUTO: 1.5 % — SIGNIFICANT CHANGE UP (ref 0–6)
EPI CELLS # UR: 3 /HPF — SIGNIFICANT CHANGE UP (ref 0–5)
GAS PNL BLDV: 138 MMOL/L — SIGNIFICANT CHANGE UP (ref 136–145)
GAS PNL BLDV: SIGNIFICANT CHANGE UP
GLUCOSE BLDV-MCNC: 201 MG/DL — HIGH (ref 70–99)
GLUCOSE SERPL-MCNC: 203 MG/DL — HIGH (ref 70–99)
GLUCOSE UR QL: NEGATIVE — SIGNIFICANT CHANGE UP
HCO3 BLDV-SCNC: 31 MMOL/L — HIGH (ref 22–29)
HCT VFR BLD CALC: 37.8 % — SIGNIFICANT CHANGE UP (ref 34.5–45)
HCT VFR BLDA CALC: 41 % — SIGNIFICANT CHANGE UP (ref 34.5–46.5)
HGB BLD CALC-MCNC: 13.7 G/DL — SIGNIFICANT CHANGE UP (ref 11.7–16.1)
HGB BLD-MCNC: 12.9 G/DL — SIGNIFICANT CHANGE UP (ref 11.5–15.5)
HYALINE CASTS # UR AUTO: 0 /LPF — SIGNIFICANT CHANGE UP (ref 0–7)
IANC: 3.4 K/UL — SIGNIFICANT CHANGE UP (ref 1.8–7.4)
IMM GRANULOCYTES NFR BLD AUTO: 0.2 % — SIGNIFICANT CHANGE UP (ref 0–0.9)
INR BLD: 1.05 RATIO — SIGNIFICANT CHANGE UP (ref 0.88–1.16)
KETONES UR-MCNC: NEGATIVE — SIGNIFICANT CHANGE UP
LACTATE BLDV-MCNC: 1.5 MMOL/L — SIGNIFICANT CHANGE UP (ref 0.5–2)
LEUKOCYTE ESTERASE UR-ACNC: ABNORMAL
LIDOCAIN IGE QN: 38 U/L — SIGNIFICANT CHANGE UP (ref 7–60)
LYMPHOCYTES # BLD AUTO: 1.4 K/UL — SIGNIFICANT CHANGE UP (ref 1–3.3)
LYMPHOCYTES # BLD AUTO: 26.7 % — SIGNIFICANT CHANGE UP (ref 13–44)
MCHC RBC-ENTMCNC: 31.8 PG — SIGNIFICANT CHANGE UP (ref 27–34)
MCHC RBC-ENTMCNC: 34.1 GM/DL — SIGNIFICANT CHANGE UP (ref 32–36)
MCV RBC AUTO: 93.1 FL — SIGNIFICANT CHANGE UP (ref 80–100)
MONOCYTES # BLD AUTO: 0.31 K/UL — SIGNIFICANT CHANGE UP (ref 0–0.9)
MONOCYTES NFR BLD AUTO: 5.9 % — SIGNIFICANT CHANGE UP (ref 2–14)
NEUTROPHILS # BLD AUTO: 3.4 K/UL — SIGNIFICANT CHANGE UP (ref 1.8–7.4)
NEUTROPHILS NFR BLD AUTO: 64.9 % — SIGNIFICANT CHANGE UP (ref 43–77)
NITRITE UR-MCNC: NEGATIVE — SIGNIFICANT CHANGE UP
NRBC # BLD: 0 /100 WBCS — SIGNIFICANT CHANGE UP (ref 0–0)
NRBC # FLD: 0 K/UL — SIGNIFICANT CHANGE UP (ref 0–0)
PCO2 BLDV: 48 MMHG — SIGNIFICANT CHANGE UP (ref 39–52)
PH BLDV: 7.42 — SIGNIFICANT CHANGE UP (ref 7.32–7.43)
PH UR: 7 — SIGNIFICANT CHANGE UP (ref 5–8)
PLATELET # BLD AUTO: 131 K/UL — LOW (ref 150–400)
PO2 BLDV: 58 MMHG — HIGH (ref 25–45)
POTASSIUM BLDV-SCNC: 3.7 MMOL/L — SIGNIFICANT CHANGE UP (ref 3.5–5.1)
POTASSIUM SERPL-MCNC: 3.6 MMOL/L — SIGNIFICANT CHANGE UP (ref 3.5–5.3)
POTASSIUM SERPL-SCNC: 3.6 MMOL/L — SIGNIFICANT CHANGE UP (ref 3.5–5.3)
PROT SERPL-MCNC: 6.8 G/DL — SIGNIFICANT CHANGE UP (ref 6–8.3)
PROT UR-MCNC: NEGATIVE — SIGNIFICANT CHANGE UP
PROTHROM AB SERPL-ACNC: 12.2 SEC — SIGNIFICANT CHANGE UP (ref 10.5–13.4)
RBC # BLD: 4.06 M/UL — SIGNIFICANT CHANGE UP (ref 3.8–5.2)
RBC # FLD: 12.4 % — SIGNIFICANT CHANGE UP (ref 10.3–14.5)
RBC CASTS # UR COMP ASSIST: 3 /HPF — SIGNIFICANT CHANGE UP (ref 0–4)
RH IG SCN BLD-IMP: POSITIVE — SIGNIFICANT CHANGE UP
SAO2 % BLDV: 88.7 % — HIGH (ref 67–88)
SODIUM SERPL-SCNC: 144 MMOL/L — SIGNIFICANT CHANGE UP (ref 135–145)
SP GR SPEC: 1.02 — SIGNIFICANT CHANGE UP (ref 1.01–1.05)
TROPONIN T, HIGH SENSITIVITY RESULT: 8 NG/L — SIGNIFICANT CHANGE UP
UROBILINOGEN FLD QL: SIGNIFICANT CHANGE UP
WBC # BLD: 5.24 K/UL — SIGNIFICANT CHANGE UP (ref 3.8–10.5)
WBC # FLD AUTO: 5.24 K/UL — SIGNIFICANT CHANGE UP (ref 3.8–10.5)
WBC UR QL: 36 /HPF — HIGH (ref 0–5)

## 2023-05-06 PROCEDURE — 99284 EMERGENCY DEPT VISIT MOD MDM: CPT

## 2023-05-06 PROCEDURE — 71045 X-RAY EXAM CHEST 1 VIEW: CPT | Mod: 26

## 2023-05-06 PROCEDURE — 99284 EMERGENCY DEPT VISIT MOD MDM: CPT | Mod: GC

## 2023-05-06 PROCEDURE — 93010 ELECTROCARDIOGRAM REPORT: CPT

## 2023-05-06 PROCEDURE — 76705 ECHO EXAM OF ABDOMEN: CPT | Mod: 26

## 2023-05-06 RX ORDER — SODIUM CHLORIDE 9 MG/ML
1000 INJECTION INTRAMUSCULAR; INTRAVENOUS; SUBCUTANEOUS ONCE
Refills: 0 | Status: COMPLETED | OUTPATIENT
Start: 2023-05-06 | End: 2023-05-06

## 2023-05-06 RX ADMIN — SODIUM CHLORIDE 1000 MILLILITER(S): 9 INJECTION INTRAMUSCULAR; INTRAVENOUS; SUBCUTANEOUS at 17:04

## 2023-05-06 NOTE — ED PROVIDER NOTE - PROGRESS NOTE DETAILS
aMrs Pulido MD:  Surgery paged for evaluation for HIDA vs. operative. vs. outpatient workup. Michel PGY-3: Per surgery, no indication for further inpatient work up, does not need HIDA. Patient reassessed, has no tenderness in her abdomen, extensively discussed with patient/daughter at bedside regarding the chronicity of enlarged GB (ultrasound reviewed from september) on imaging without symptoms (denies pain, fevers, nausea). Will dc with surgery and GI follow up info. Copy of results provided. Michel PGY-3: Per surgery, no indication for further inpatient work up, does not need HIDA. Patient reassessed, has no tenderness in her abdomen, extensively discussed with patient/daughter at bedside regarding the chronicity of enlarged GB (ultrasound reviewed from september) on imaging without symptoms (denies pain, fevers, nausea). Will dc with surgery and GI follow up info. Copy of results provided. Tolerate PO dinner tray without issues.

## 2023-05-06 NOTE — ED ADULT TRIAGE NOTE - CHIEF COMPLAINT QUOTE
Pt with Hx of gallstones co pain to right side of abdomen and left upper back area x 2 days. pt with no fever no vomiting in triage.

## 2023-05-06 NOTE — ED PROVIDER NOTE - PHYSICAL EXAMINATION
Physical Exam:  General: NAD, Conversive  Eyes: EOMI, Conjunctiva and sclera clear  Neck: No JVD  Lungs: Clear to auscultation bilaterally, no wheeze, no rhonchi  Heart: Normal S1, S2, no murmurs  Abdomen: Soft, nontender, nondistended, no CVA tenderness  Extremities: 2+ peripheral pulses, no edema, ranging upper extremities  Psych: AAO X3  Neurologic: Non-focal

## 2023-05-06 NOTE — ED PROVIDER NOTE - NSFOLLOWUPINSTRUCTIONS_ED_ALL_ED_FT
You were seen in the Emergency Department for: enlarged gallbladder    For pain/fever, you may take Tylenol (acetaminophen) 650 mg every 6 hours.    Please follow up with your primary physician as well as with our general surgery and gastroenterology team as discussed. If you do not have a primary physician or specialist of your needs, please call [phone] to find one convenient for you. At this number you will be able to locate a provider who accepts your insurance, as well as locate the right specialist for your needs.    You should return to the Emergency Department if you feel any new/worsening/persistent symptoms including but not limited to: chest pain, difficulty breathing, loss of consciousness, bleeding, uncontrolled pain, numbness/weakness of a body part

## 2023-05-06 NOTE — ED PROVIDER NOTE - NSFOLLOWUPCLINICS_GEN_ALL_ED_FT
[facility]  Gastroenterology  [address] [place] [address], [facility] [address]  [place] [address]  Phone: [phone]  Fax:     [facility]  General Surgery  [address] [place] [id number]  Phone: [phone]  Fax:

## 2023-05-06 NOTE — ED PROVIDER NOTE - PATIENT PORTAL LINK FT
You can access the FollowMyHealth Patient Portal offered by [facility] by registering at the following website: [email or web address]. By joining [facility]’s FollowMyHealth portal, you will also be able to view your health information using other applications (apps) compatible with our system.

## 2023-05-06 NOTE — ED PROVIDER NOTE - ATTENDING CONTRIBUTION TO CARE
I have personally performed a face to face medical and diagnostic evaluation of the patient. I have discussed with and reviewed the Resident's note and agree with the History, ROS, Physical Exam and MDM unless otherwise indicated. A brief summary of my personal evaluation and impression can be found below.    Michael AMADOR: 78-year-old female, history of hypertension chronic left shoulder pain, presents with a chief complaint of intermittent abdominal bloating, over the last several days and weeks per family reports that things getting worse today, associated with chronic left-sided shoulder discomfort, patient's family reports had a CT scan performed outpatient earlier this week, does not have the results yet, no fever.  No chest pain no shortness of breath no nausea no vomiting no fever no chills no urinary or bowel complaints.  Bowel movements.    All other ROS negative, except as above and as per HPI and ROS section.    VITALS: Initial triage and subsequent vitals have been reviewed by me.  GEN APPEARANCE: Alert, non-toxic, well-appearing, NAD.  HEAD: Atraumatic.  EYES: PERRLa, EOMI, vision grossly intact.   NECK: Supple  CV: RRR, S1S2, no c/r/m/g. Cap refill <2 seconds. No bruits.   LUNGS: CTAB. No abnormal breath sounds.  ABDOMEN: Soft, NTND. No guarding or rebound.   MSK/EXT: mild L trap spasm No spinal or extremity point tenderness. No CVA ttp. Pelvis stable. No peripheral edema.  NEURO: Alert, follows commands. Weight bearing normal. Speech normal. Sensation and motor normal x4 extremities.   SKIN: Warm, dry and intact. No rash.  PSYCH: Appropriate    Plan/MDM: Patient is stable nontoxic-appearing physical exam as above, DDx unclear etiology of patient's pain, CT images reviewed without final read, there is an obvious gallstone in the gallbladder, however pains are appear to be localized to the left shoulder area with obvious left trap and spasm, low concern for ACS, however given mild abdominal discomfort will check right upper quadrant sono check labs EKG cardiac enzymes chest x-ray will give meds as needed and reassess consider surgical consult as indicated.

## 2023-05-06 NOTE — ED PROVIDER NOTE - NS ED ROS FT
GENERAL: No fever or chills  EYES: No change in vision  HEENT: No trouble swallowing or speaking  CARDIAC: No chest pain  PULMONARY: No cough or SOB  GI: No abdominal pain, no nausea or no vomiting, no diarrhea or constipation, + bloating  : No changes in urination  SKIN: No rashes  NEURO: No headache, no numbness  MSK: No joint pain  Otherwise as HPI or negative.

## 2023-05-06 NOTE — ED PROVIDER NOTE - CLINICAL SUMMARY MEDICAL DECISION MAKING FREE TEXT BOX
78-year-old female presenting with intermittent bloating, CT performed 2 days ago with demonstrated gallstone, potentially in neck, no official read at this time.  Primary concern for rule out cholecystitis/cholelithiasis with associated bloating/pain, patient has difficulty describing chronicity/extent of pain.  Requesting daughter translate.  Will perform basic screening evaluation, troponin, chest x-ray, ultrasound right upper quadrant.  Based on results consider surgery consultation.

## 2023-05-06 NOTE — CONSULT NOTE ADULT - ASSESSMENT
ASSESSMENT:  78F Hep B, HTN, anxiety/depression, nephrolithiasis (2012) presenting with abdominal bloating and neck/left shoulder pain for two weeks. Patient does not meet criteria for cholecystitis on [place] guidelines. The gallbladder findings appear to be chronic and unlikely to explain her symptoms.    PLAN:    - PO challenge  - No indication for surgical intervention or admission at this time  - No need for HIDA at this time    Tobin Montes  General Surgery  B Team  [id number]

## 2023-05-06 NOTE — ED PROVIDER NOTE - OBJECTIVE STATEMENT
78-year-old female history of hypertension, chronic left shoulder pain, presenting with intermittent abdominal bloating, presenting due to intermittent bloating of last several days/weeks.  Patient denies overtly any abdominal pain, nausea, vomiting.  States bloating is intermittent improves with bowel movements at times, other complaint of left shoulder which is able to be ranged entirely with no concerning features including any tenderness to palpation, any inability for motion, loss of sensation.  Patient denies any chest pain, difficulty breathing, nausea, vomiting, chest pain, fever, chills.

## 2023-05-06 NOTE — CONSULT NOTE ADULT - SUBJECTIVE AND OBJECTIVE BOX
GENERAL SURGERY CONSULT NOTE  --------------------------------------------------------------------------------------------    HPI:   Patient is a 78y old  Female who presents with a chief complaint of 2 weeks of abdominal bloating    HPI:  78F Hep B, HTN, anxiety/depression, nephrolithiasis (2012) presenting with abdominal bloating and neck/left shoulder pain for two weeks.    Patient also reports 5lb weight gain, gassiness, decreased appetite. She denies any f/c, abdominal pain, n/v, diarrhea at this time. Also has fatigue, constipation but is passing flatus and bm, improved with colace and chinese herbs. On 5/4 she had a CT noncon to evaluate for kidney stone on the left because at that time she was having left flank/back pain. She did not have a stone, but the gallbladder was found to be distended with a stone at the neck.    Of note, an MRI from 2021 that the patient's daughter brought shows a distended gallbladder with a stone in the cystic duct. At that time she had no symptoms.    ROS: 10-system review is otherwise negative except HPI above.      PAST MEDICAL & SURGICAL HISTORY:  HTN (hypertension)      Anxiety      No significant past surgical history        FAMILY HISTORY:  No pertinent family history in first degree relatives    [] Family history not pertinent as reviewed with the patient and family    SOCIAL HISTORY:    No tobacco use, no alcohol use    ALLERGIES: penicillin (Hives)      HOME MEDICATIONS:   clonazePAM:  (17 Jun 2019 07:13)  metoprolol:  (17 Jun 2019 07:13)      CURRENT MEDICATIONS  MEDICATIONS (STANDING):   MEDICATIONS (PRN):  --------------------------------------------------------------------------------------------    Vitals:   T(C): 36.8 (05-06-23 @ 16:01), Max: 36.8 (05-06-23 @ 16:01)  HR: 83 (05-06-23 @ 16:01) (83 - 83)  BP: 165/90 (05-06-23 @ 16:01) (165/90 - 165/90)  RR: 18 (05-06-23 @ 16:01) (18 - 18)  SpO2: 100% (05-06-23 @ 16:01) (100% - 100%)  CAPILLARY BLOOD GLUCOSE              PHYSICAL EXAM:   General: NAD, Lying in bed comfortably  Neuro: A+Ox3  HEENT: NC/AT, EOMI  Neck: Soft, supple  Cardio: regular rate  Resp: Good effort  GI/Abd: Soft, NT/ND, no rebound/guarding, no masses palpated  Vascular: All 4 extremities warm.  Skin: Intact, no breakdown  --------------------------------------------------------------------------------------------    LABS  CBC (05-06 @ 16:45)                              12.9                           5.24    )----------------(  131<L>     64.9  % Neutrophils, 26.7  % Lymphocytes, ANC: 3.40                                37.8      BMP (05-06 @ 16:45)             144     |  106     |  25<H> 		Ca++ --      Ca 9.3                ---------------------------------( 203<H>		Mg --                 3.6     |  26      |  0.95  			Ph --        LFTs (05-06 @ 16:45)      TPro 6.8 / Alb 4.3 / TBili 0.2 / DBili -- / AST 14 / ALT 13 / AlkPhos 93    Coags (05-06 @ 16:45)  aPTT 29.4 / INR 1.05 / PT 12.2        VBG (05-06 @ 16:45)     7.42 / 48 / 58<H> / 31<H> / 5.5<H> / 88.7<H>%     Lactate: 1.5    --------------------------------------------------------------------------------------------    MICROBIOLOGY      --------------------------------------------------------------------------------------------    IMAGING  < from: US Abdomen Upper Quadrant Right (05.06.23 @ 18:13) >  FINDINGS:  Liver: Within normal limits.  Bile ducts: Normal caliber. Common bile duct measures 7 mm.  Gallbladder: The gallbladder is distended. There is no gallbladder wall   thickening. The 1 cm calculus in the region of the neck of the   gallbladder seen on the prior CT is not visualized on ultrasound. There   is no sonographic Arevalo sign. There is no pericholecystic fluid.  Pancreas: Visualized portions are within normal limits.  Right kidney: 10.8 cm. No hydronephrosis. There is 2.7 cm simple   appearing parapelvic cyst in the midpole  Ascites: None.  IVC: Visualized portions are within normal limits.    IMPRESSION:  Hydropic gallbladder likely related to a stone impacted in the region of   the neck of the gallbladder.    The 1 cm calculus in the region of the neck of the gallbladder seen on   the prior CT is not visualized on today's ultrasound.    There is no sonographic Arevalo sign or gallbladderwall thickening.    < end of copied text >      --------------------------------------------------------------------------------------------

## 2023-05-06 NOTE — ED ADULT NURSE NOTE - SUICIDE SCREENING QUESTION 2
Afebrile, no objective signs of pain noted. Vital signs stable, on room air. Patient nonverbal. Alert, unable to assess orientation. Total care, up with lift. Turned and repositioned side to side as indicated. Patient cooperative except for agitation during memo care. Incontinent of stool once. Lerma patent, draining clear yellow urine. Coccyx wound is pink and blanchable. Rash with scabs on extremities and abdomen. PEG tube intact, tube feeding running at 60 mL/hr. Receiving IV antibiotics and IV fluids.    No

## 2023-05-06 NOTE — ED ADULT NURSE NOTE - OBJECTIVE STATEMENT
pt to ED with multiple complaints. pt Mandarin speaking, pt prefers daughter at bedside to translate. pt reporting abdominal pain, upper back pain with intermittent numbness/pain down L shoulder/arm, other wise neuro system is intact. pt denies any trauma to C spine, fevers, n/v/d, chest pain, sob, dysuria, hematuria, dysuria. pt with normal BM this AM. pt reports hx of kidney stones and gallstones. NAD noted at this time. respirations even and nonlabored on RA. 18G IV placed in LAC, labs drawn and sent. MD at bedside for evaluation.

## 2023-05-07 LAB
CULTURE RESULTS: SIGNIFICANT CHANGE UP
SPECIMEN SOURCE: SIGNIFICANT CHANGE UP

## 2023-05-11 ENCOUNTER — NON-APPOINTMENT (OUTPATIENT)
Age: 78
End: 2023-05-11

## 2023-06-20 ENCOUNTER — RX RENEWAL (OUTPATIENT)
Age: 78
End: 2023-06-20

## 2023-07-25 ENCOUNTER — TRANSCRIPTION ENCOUNTER (OUTPATIENT)
Age: 78
End: 2023-07-25

## 2023-07-26 NOTE — PHYSICAL EXAM
[General Appearance - Well Developed] : well developed [Normal Appearance] : normal appearance [Well Groomed] : well groomed [No Deformities] : no deformities [General Appearance - Well Nourished] : well nourished [General Appearance - In No Acute Distress] : no acute distress [Normal Conjunctiva] : the conjunctiva exhibited no abnormalities [Eyelids - No Xanthelasma] : the eyelids demonstrated no xanthelasmas [Normal Oral Mucosa] : normal oral mucosa [No Oral Pallor] : no oral pallor [No Oral Cyanosis] : no oral cyanosis [Normal Jugular Venous A Waves Present] : normal jugular venous A waves present [Normal Jugular Venous V Waves Present] : normal jugular venous V waves present [No Jugular Venous Aggarwal A Waves] : no jugular venous aggarwal A waves [Respiration, Rhythm And Depth] : normal respiratory rhythm and effort [Exaggerated Use Of Accessory Muscles For Inspiration] : no accessory muscle use [Auscultation Breath Sounds / Voice Sounds] : lungs were clear to auscultation bilaterally [Heart Sounds] : normal S1 and S2 [Heart Rate And Rhythm] : heart rate and rhythm were normal [Murmurs] : no murmurs present [Arterial Pulses Normal] : the arterial pulses were normal [Edema] : no peripheral edema present [Abdomen Soft] : soft [Abdomen Tenderness] : non-tender [Abdomen Mass (___ Cm)] : no abdominal mass palpated [Abnormal Walk] : normal gait [Gait - Sufficient For Exercise Testing] : the gait was sufficient for exercise testing [Nail Clubbing] : no clubbing of the fingernails [Cyanosis, Localized] : no localized cyanosis [Petechial Hemorrhages (___cm)] : no petechial hemorrhages [] : no ischemic changes [Oriented To Time, Place, And Person] : oriented to person, place, and time [Affect] : the affect was normal [Mood] : the mood was normal [No Anxiety] : not feeling anxious

## 2023-07-27 ENCOUNTER — NON-APPOINTMENT (OUTPATIENT)
Age: 78
End: 2023-07-27

## 2023-07-27 ENCOUNTER — APPOINTMENT (OUTPATIENT)
Dept: CARDIOLOGY | Facility: CLINIC | Age: 78
End: 2023-07-27
Payer: MEDICARE

## 2023-07-27 VITALS
BODY MASS INDEX: 28.72 KG/M2 | DIASTOLIC BLOOD PRESSURE: 83 MMHG | WEIGHT: 157 LBS | TEMPERATURE: 97.8 F | SYSTOLIC BLOOD PRESSURE: 150 MMHG | RESPIRATION RATE: 18 BRPM | OXYGEN SATURATION: 95 % | HEART RATE: 74 BPM

## 2023-07-27 VITALS — DIASTOLIC BLOOD PRESSURE: 80 MMHG | SYSTOLIC BLOOD PRESSURE: 130 MMHG

## 2023-07-27 DIAGNOSIS — R60.9 EDEMA, UNSPECIFIED: ICD-10-CM

## 2023-07-27 PROCEDURE — 99214 OFFICE O/P EST MOD 30 MIN: CPT | Mod: 25

## 2023-07-27 PROCEDURE — 93000 ELECTROCARDIOGRAM COMPLETE: CPT

## 2023-07-27 RX ORDER — FAMOTIDINE 40 MG/1
40 TABLET, FILM COATED ORAL DAILY
Qty: 90 | Refills: 1 | Status: ACTIVE | COMMUNITY
Start: 2023-07-27 | End: 1900-01-01

## 2023-07-27 RX ORDER — ASPIRIN ENTERIC COATED TABLETS 81 MG 81 MG/1
81 TABLET, DELAYED RELEASE ORAL
Qty: 90 | Refills: 1 | Status: DISCONTINUED | COMMUNITY
Start: 2022-10-19 | End: 2023-07-27

## 2023-07-27 RX ORDER — MAGNESIUM OXIDE 241.3 MG/1000MG
400 TABLET ORAL DAILY
Qty: 30 | Refills: 5 | Status: ACTIVE | COMMUNITY
Start: 2021-12-13 | End: 1900-01-01

## 2023-07-27 RX ORDER — DILTIAZEM HYDROCHLORIDE 30 MG/1
30 TABLET ORAL
Qty: 60 | Refills: 5 | Status: DISCONTINUED | COMMUNITY
Start: 2022-11-11 | End: 2023-07-27

## 2023-07-27 NOTE — REASON FOR VISIT
[FreeTextEntry1] : 77-year-old female with HTN, hyperglycemia, kidney stone, hepatitis B carrier, anxiety disorder, presents for followup.  \par \par Patient was last seen on 2/16/23 - Pt reports tiredness. Her BP sometimes 90s/60s. She is on Metoprolol ER 50 mg BID, Enalapril 10 mg BID, Amlodipine 2.5 mg, and ASA 81 mg. Pt reports occasional palpitations. Pt denies CP and SOB. Pt reports B/L forearm swelling sometimes. Fu in 3 months. \par \par She is on ASA 81 mg for CAD.  She is on Amlodipine 2.5 mg, Metoprolol 50 mg BID, and  Enalapril 10 mg BID for HTN.\par \par Patient underwent an echocardiogram 10/25/22 and it showed normal LV function without significant valvular pathology.  \par \par Patient underwent a pharmacologic nuclear stress test 10/18/22 and it showed mild RCA/LCx ischemia.  I advised patient to start ASA 81 mg for now.  Her LDL is <100 so will hold off statin for now.\par \par Patient wore a 7 day zio patch 3/21/22 and it showed No A. Fib. Average HR 65, rare PVCs and couplets, rare PACs, couplets and triplets.. \par \par Echo on 1/3/19  showed normal LV function without significant valvular pathology. \par \par treadmill stress test on 1/3/19 and completed 3 minutes of Glenn protocol.  There were upsloping ST depressions on ECG but no symptoms.  Following treadmill stress, there is no echocardiographic evidence of ischemia. \par \par

## 2023-07-27 NOTE — HISTORY OF PRESENT ILLNESS
[FreeTextEntry1] : 7/27/23 - Patient reports ER visit in May for Cholelithiasis. But no colic. No surgery. Patient reports left chest discomfort with sensation of cough. It occurs more often at night. She takes Famotidine 40 mg occasionally. Her recent LDL is 98.\par \par 2/16/23 - Pt reports tiredness. Her BP sometimes 90s/60s. She is on Metoprolol ER 50 mg BID, Enalapril 10 mg BID, Amlodipine 2.5 mg, and ASA 81 mg. Pt reports occasional palpitations. Pt denies CP and SOB. Pt reports B/L forearm swelling sometimes. Fu in 3 months. \par \par 10/25/22 - Patient reports palpitations for 1 week since starting Amlodipine.  I advised patient to undergo an echocardiogram.  Patient underwent an echocardiogram and it showed normal LV function without significant valvular pathology.  I advised patient to stop Amlodipine.  I advised patient to increase Metoprolol to 50 mg BD.  \par \par 10/13/22 - Patient reports left-sided CP yesterday but OK today.  Patient reports palpitations lasting seconds similar to March.  Her BP was elevated.  I advised patient to start Amlodipine 2.5 mg QD.  I advised patient to undergo a pharmacologic nuclear stress test. Patient underwent a pharmacologic nuclear stress test and it showed mild RCA/LCx ischemia.  I advised patient to start ASA 81 mg for now.  Her LDL is <100 so will hold off statin for now.\par \par 3/21/22 - Patient reports L sided discomfort focally from palpitations lasting seconds.  Patient denies CP. Patient denies SOB. She would feel it at night during her sleep. I advised patient to wear 7 day zio patch and it showed No A. Fib. Average HR 65, rare PVCs and couplets, rare PACs, couplets and triplets.. \par \par 12/13/21 - Patient was diagnosed with glaucoma but she does not feel comfortable with the eyedrops and would like to get second opinion, Patient reports osteoarthritis with knee pain. Patient also reports getting leg cramps last night while cooking in kitchen. I advised patient to supplement with magnesium. \par \par 6/4/21 - Patient has been stable.  Patient denies CP, SOB, palpitations, or lightheadedness.  She has trace LE edema.  I advised patient to undergo an echocardiogram.   ECG for HTN.\par \par 11/21/19 - Patient reports that she fell from stool and fractured several ribs in June. Patient was hospitalized to r/o pneumonia. Patient reports occasional CP when she is tired.  Patient has occasional palpitations lasting seconds. Her BP is 120/80 at home.

## 2023-10-30 ENCOUNTER — APPOINTMENT (OUTPATIENT)
Dept: CARDIOLOGY | Facility: CLINIC | Age: 78
End: 2023-10-30
Payer: MEDICARE

## 2023-10-30 VITALS
OXYGEN SATURATION: 96 % | HEART RATE: 66 BPM | RESPIRATION RATE: 18 BRPM | DIASTOLIC BLOOD PRESSURE: 81 MMHG | BODY MASS INDEX: 28.9 KG/M2 | SYSTOLIC BLOOD PRESSURE: 153 MMHG | TEMPERATURE: 97.6 F | WEIGHT: 158 LBS

## 2023-10-30 DIAGNOSIS — R00.2 PALPITATIONS: ICD-10-CM

## 2023-10-30 DIAGNOSIS — R94.39 ABNORMAL RESULT OF OTHER CARDIOVASCULAR FUNCTION STUDY: ICD-10-CM

## 2023-10-30 PROCEDURE — 99213 OFFICE O/P EST LOW 20 MIN: CPT

## 2023-11-06 NOTE — ED PROVIDER NOTE - NSCAREINITIATED _GEN_ER
Temo Arcos(Resident) Methotrexate Counseling:  Patient counseled regarding adverse effects of methotrexate including but not limited to nausea, vomiting, abnormalities in liver function tests. Patients may develop mouth sores, rash, diarrhea, and abnormalities in blood counts. The patient understands that monitoring is required including LFT's and blood counts.  There is a rare possibility of scarring of the liver and lung problems that can occur when taking methotrexate. Persistent nausea, loss of appetite, pale stools, dark urine, cough, and shortness of breath should be reported immediately. Patient advised to discontinue methotrexate treatment at least three months before attempting to become pregnant.  I discussed the need for folate supplements while taking methotrexate.  These supplements can decrease side effects during methotrexate treatment. The patient verbalized understanding of the proper use and possible adverse effects of methotrexate.  All of the patient's questions and concerns were addressed.

## 2023-12-18 ENCOUNTER — RX RENEWAL (OUTPATIENT)
Age: 78
End: 2023-12-18

## 2024-01-03 ENCOUNTER — APPOINTMENT (OUTPATIENT)
Dept: CARDIOLOGY | Facility: CLINIC | Age: 79
End: 2024-01-03
Payer: MEDICARE

## 2024-01-03 VITALS
OXYGEN SATURATION: 96 % | WEIGHT: 157 LBS | HEART RATE: 67 BPM | RESPIRATION RATE: 18 BRPM | TEMPERATURE: 97.7 F | SYSTOLIC BLOOD PRESSURE: 157 MMHG | BODY MASS INDEX: 28.72 KG/M2 | DIASTOLIC BLOOD PRESSURE: 88 MMHG

## 2024-01-03 PROCEDURE — 99214 OFFICE O/P EST MOD 30 MIN: CPT

## 2024-01-03 NOTE — REASON FOR VISIT
[FreeTextEntry1] : 77-year-old female with possible CAD (abnormal nuclear stress in 2022), HTN, hyperglycemia, kidney stone, hepatitis B carrier, anxiety disorder, presents for followup.    Patient was last seen on 10/30/23 - Patient reports lower CP, described as palpitations. Her BP was elevated in office but normal at home. I advised patient to wear a 7-day event monitor. Patient wore a event monitor and it showed average HR of 62, rare APC's, rare PVC's, and a short run of PAT (6 beats). No AFIB noted.  She is on ASA 81 mg for CAD.  She is on HCTZ 12.5 mg AM, Metoprolol 50 mg BID, and Enalapril 10 mg BID for HTN.  Patient underwent an echocardiogram 10/25/22 and it showed normal LV function without significant valvular pathology.    Patient underwent a pharmacologic nuclear stress test 10/18/22 and it showed mild RCA/LCx ischemia.  I advised patient to start ASA 81 mg for now.  Her LDL is <100 so will hold off statin for now.  Patient wore a 7 day zio patch 3/21/22 and it showed No A. Fib. Average HR 65, rare PVCs and couplets, rare PACs, couplets and triplets..   Echo on 1/3/19  showed normal LV function without significant valvular pathology.   treadmill stress test on 1/3/19 and completed 3 minutes of Glenn protocol.  There were upsloping ST depressions on ECG but no symptoms.  Following treadmill stress, there is no echocardiographic evidence of ischemia.

## 2024-01-03 NOTE — HISTORY OF PRESENT ILLNESS
[FreeTextEntry1] : 1/3/24 - Patient reports that last night she woke with sweats and her BP was elevated at 153/101.  Her FS was 170.  Her BP yesterday AM was normal 111/70.  Patient denies CP or SOB.  I advised patient to start Amlodipine 2.5 mg in PM.  I advised patient to continue HCTZ 12.5 mg AM, Metoprolol 50 mg BID, and Enalapril 10 mg BID for HTN.  Patient reports anxiety and depression.    10/30/23 - Patient reports lower CP, described as palpitations.  Her BP was elevated in office but normal at home.  I advised patient to wear a 7-day event monitor.  Patient wore a event monitor and it showed average HR of 62, rare APC's, rare PVC's, and a short run of PAT (6 beats). No AFIB noted.  7/27/23 - Patient reports ER visit in May for Cholelithiasis. But no colic. No surgery. Patient reports left chest discomfort with sensation of cough. It occurs more often at night. She takes Famotidine 40 mg occasionally. Her recent LDL is 98.  2/16/23 - Pt reports tiredness. Her BP sometimes 90s/60s. She is on Metoprolol ER 50 mg BID, Enalapril 10 mg BID, Amlodipine 2.5 mg, and ASA 81 mg. Pt reports occasional palpitations. Pt denies CP and SOB. Pt reports B/L forearm swelling sometimes. Fu in 3 months.   10/25/22 - Patient reports palpitations for 1 week since starting Amlodipine.  I advised patient to undergo an echocardiogram.  Patient underwent an echocardiogram and it showed normal LV function without significant valvular pathology.  I advised patient to stop Amlodipine.  I advised patient to increase Metoprolol to 50 mg BD.    10/13/22 - Patient reports left-sided CP yesterday but OK today.  Patient reports palpitations lasting seconds similar to March.  Her BP was elevated.  I advised patient to start Amlodipine 2.5 mg QD.  I advised patient to undergo a pharmacologic nuclear stress test. Patient underwent a pharmacologic nuclear stress test and it showed mild RCA/LCx ischemia.  I advised patient to start ASA 81 mg for now.  Her LDL is <100 so will hold off statin for now.  3/21/22 - Patient reports L sided discomfort focally from palpitations lasting seconds.  Patient denies CP. Patient denies SOB. She would feel it at night during her sleep. I advised patient to wear 7 day zio patch and it showed No A. Fib. Average HR 65, rare PVCs and couplets, rare PACs, couplets and triplets..   12/13/21 - Patient was diagnosed with glaucoma but she does not feel comfortable with the eyedrops and would like to get second opinion, Patient reports osteoarthritis with knee pain. Patient also reports getting leg cramps last night while cooking in kitchen. I advised patient to supplement with magnesium.   6/4/21 - Patient has been stable.  Patient denies CP, SOB, palpitations, or lightheadedness.  She has trace LE edema.  I advised patient to undergo an echocardiogram.   ECG for HTN.  11/21/19 - Patient reports that she fell from stool and fractured several ribs in June. Patient was hospitalized to r/o pneumonia. Patient reports occasional CP when she is tired.  Patient has occasional palpitations lasting seconds. Her BP is 120/80 at home.

## 2024-02-24 NOTE — ED PROVIDER NOTE - NS ED MD DISPO DISCHARGE CCDA
Admission/Transfer to [address] [place] [address]    Admission to inpatient unit Date:  2/23/24 at 6:20 pm to room 82-2 in stable condition from unit PACU. Orientation to unit and plan of care discussed with pt. Call light and personal belongings within reach.     Report Received from: PACU RN    Hiram Risk: Yes  Code Status: Full Resuscitation   Items sent to Security: No    Vital signs on admission: Visit Vitals  BP (!) 144/65 (BP Location: LUE - Left upper extremity, Patient Position: Supine)   Pulse 82   Temp 97.9 °F (36.6 °C) (Oral)   Resp 18   Ht 5' 5\" (1.651 m)   Wt 85.1 kg (187 lb 9.8 oz)   SpO2 95%   BMI 31.22 kg/m²         Patient/Caregiver provided printed discharge information.

## 2024-03-11 RX ORDER — LANCETS 33 GAUGE
EACH MISCELLANEOUS
Qty: 300 | Refills: 1 | Status: ACTIVE | COMMUNITY
Start: 2021-12-08 | End: 1900-01-01

## 2024-03-11 RX ORDER — BLOOD SUGAR DIAGNOSTIC
STRIP MISCELLANEOUS 3 TIMES DAILY
Qty: 300 | Refills: 1 | Status: ACTIVE | COMMUNITY
Start: 2020-05-04 | End: 1900-01-01

## 2024-03-21 ENCOUNTER — RX RENEWAL (OUTPATIENT)
Age: 79
End: 2024-03-21

## 2024-03-22 ENCOUNTER — EMERGENCY (EMERGENCY)
Facility: HOSPITAL | Age: 79
LOS: 1 days | Discharge: HOME | End: 2024-03-22
Attending: EMERGENCY MEDICINE | Admitting: EMERGENCY MEDICINE
Payer: MEDICARE

## 2024-03-22 VITALS
HEART RATE: 67 BPM | TEMPERATURE: 98 F | SYSTOLIC BLOOD PRESSURE: 158 MMHG | RESPIRATION RATE: 18 BRPM | OXYGEN SATURATION: 100 % | DIASTOLIC BLOOD PRESSURE: 85 MMHG

## 2024-03-22 LAB
ALBUMIN SERPL ELPH-MCNC: 4 G/DL — SIGNIFICANT CHANGE UP (ref 3.3–5)
ALP SERPL-CCNC: 92 U/L — SIGNIFICANT CHANGE UP (ref 40–120)
ALT FLD-CCNC: 9 U/L — SIGNIFICANT CHANGE UP (ref 4–33)
ANION GAP SERPL CALC-SCNC: 10 MMOL/L — SIGNIFICANT CHANGE UP (ref 7–14)
APTT BLD: 33.7 SEC — SIGNIFICANT CHANGE UP (ref 24.5–35.6)
AST SERPL-CCNC: 15 U/L — SIGNIFICANT CHANGE UP (ref 4–32)
BASOPHILS # BLD AUTO: 0.05 K/UL — SIGNIFICANT CHANGE UP (ref 0–0.2)
BASOPHILS NFR BLD AUTO: 1 % — SIGNIFICANT CHANGE UP (ref 0–2)
BILIRUB SERPL-MCNC: 0.3 MG/DL — SIGNIFICANT CHANGE UP (ref 0.2–1.2)
BUN SERPL-MCNC: 19 MG/DL — SIGNIFICANT CHANGE UP (ref 7–23)
CALCIUM SERPL-MCNC: 9.1 MG/DL — SIGNIFICANT CHANGE UP (ref 8.4–10.5)
CHLORIDE SERPL-SCNC: 103 MMOL/L — SIGNIFICANT CHANGE UP (ref 98–107)
CK SERPL-CCNC: 66 U/L — SIGNIFICANT CHANGE UP (ref 25–170)
CO2 SERPL-SCNC: 26 MMOL/L — SIGNIFICANT CHANGE UP (ref 22–31)
CREAT SERPL-MCNC: 0.78 MG/DL — SIGNIFICANT CHANGE UP (ref 0.5–1.3)
EGFR: 77 ML/MIN/1.73M2 — SIGNIFICANT CHANGE UP
EOSINOPHIL # BLD AUTO: 0.1 K/UL — SIGNIFICANT CHANGE UP (ref 0–0.5)
EOSINOPHIL NFR BLD AUTO: 2 % — SIGNIFICANT CHANGE UP (ref 0–6)
GLUCOSE SERPL-MCNC: 131 MG/DL — HIGH (ref 70–99)
HCT VFR BLD CALC: 36 % — SIGNIFICANT CHANGE UP (ref 34.5–45)
HGB BLD-MCNC: 12.5 G/DL — SIGNIFICANT CHANGE UP (ref 11.5–15.5)
IANC: 3.14 K/UL — SIGNIFICANT CHANGE UP (ref 1.8–7.4)
IMM GRANULOCYTES NFR BLD AUTO: 0.2 % — SIGNIFICANT CHANGE UP (ref 0–0.9)
INR BLD: 0.92 RATIO — SIGNIFICANT CHANGE UP (ref 0.85–1.18)
LACTATE SERPL-SCNC: 0.9 MMOL/L — SIGNIFICANT CHANGE UP (ref 0.5–2)
LYMPHOCYTES # BLD AUTO: 1.33 K/UL — SIGNIFICANT CHANGE UP (ref 1–3.3)
LYMPHOCYTES # BLD AUTO: 26.5 % — SIGNIFICANT CHANGE UP (ref 13–44)
MCHC RBC-ENTMCNC: 32 PG — SIGNIFICANT CHANGE UP (ref 27–34)
MCHC RBC-ENTMCNC: 34.7 GM/DL — SIGNIFICANT CHANGE UP (ref 32–36)
MCV RBC AUTO: 92.1 FL — SIGNIFICANT CHANGE UP (ref 80–100)
MONOCYTES # BLD AUTO: 0.38 K/UL — SIGNIFICANT CHANGE UP (ref 0–0.9)
MONOCYTES NFR BLD AUTO: 7.6 % — SIGNIFICANT CHANGE UP (ref 2–14)
NEUTROPHILS # BLD AUTO: 3.14 K/UL — SIGNIFICANT CHANGE UP (ref 1.8–7.4)
NEUTROPHILS NFR BLD AUTO: 62.7 % — SIGNIFICANT CHANGE UP (ref 43–77)
NRBC # BLD: 0 /100 WBCS — SIGNIFICANT CHANGE UP (ref 0–0)
NRBC # FLD: 0 K/UL — SIGNIFICANT CHANGE UP (ref 0–0)
PLATELET # BLD AUTO: 117 K/UL — LOW (ref 150–400)
POTASSIUM SERPL-MCNC: 4.6 MMOL/L — SIGNIFICANT CHANGE UP (ref 3.5–5.3)
POTASSIUM SERPL-SCNC: 4.6 MMOL/L — SIGNIFICANT CHANGE UP (ref 3.5–5.3)
PROT SERPL-MCNC: 7.2 G/DL — SIGNIFICANT CHANGE UP (ref 6–8.3)
PROTHROM AB SERPL-ACNC: 10.3 SEC — SIGNIFICANT CHANGE UP (ref 9.5–13)
RBC # BLD: 3.91 M/UL — SIGNIFICANT CHANGE UP (ref 3.8–5.2)
RBC # FLD: 12.2 % — SIGNIFICANT CHANGE UP (ref 10.3–14.5)
SODIUM SERPL-SCNC: 139 MMOL/L — SIGNIFICANT CHANGE UP (ref 135–145)
TROPONIN T, HIGH SENSITIVITY RESULT: 8 NG/L — SIGNIFICANT CHANGE UP
WBC # BLD: 5.01 K/UL — SIGNIFICANT CHANGE UP (ref 3.8–10.5)
WBC # FLD AUTO: 5.01 K/UL — SIGNIFICANT CHANGE UP (ref 3.8–10.5)

## 2024-03-22 PROCEDURE — 70450 CT HEAD/BRAIN W/O DYE: CPT | Mod: 26,MC

## 2024-03-22 PROCEDURE — 72125 CT NECK SPINE W/O DYE: CPT | Mod: 26,MC

## 2024-03-22 PROCEDURE — 93010 ELECTROCARDIOGRAM REPORT: CPT

## 2024-03-22 PROCEDURE — 99285 EMERGENCY DEPT VISIT HI MDM: CPT

## 2024-03-22 PROCEDURE — 73562 X-RAY EXAM OF KNEE 3: CPT | Mod: 26,50

## 2024-03-22 PROCEDURE — 74177 CT ABD & PELVIS W/CONTRAST: CPT | Mod: 26,MC

## 2024-03-22 PROCEDURE — 71260 CT THORAX DX C+: CPT | Mod: 26,MC

## 2024-03-22 PROCEDURE — 71045 X-RAY EXAM CHEST 1 VIEW: CPT | Mod: 26

## 2024-03-22 RX ORDER — SODIUM CHLORIDE 9 MG/ML
500 INJECTION INTRAMUSCULAR; INTRAVENOUS; SUBCUTANEOUS ONCE
Refills: 0 | Status: COMPLETED | OUTPATIENT
Start: 2024-03-22 | End: 2024-03-22

## 2024-03-22 RX ADMIN — SODIUM CHLORIDE 500 MILLILITER(S): 9 INJECTION INTRAMUSCULAR; INTRAVENOUS; SUBCUTANEOUS at 21:05

## 2024-03-22 NOTE — ED PROVIDER NOTE - PATIENT PORTAL LINK FT
You can access the FollowMyHealth Patient Portal offered by [facility] by registering at the following website: [email or web address]. By joining [employer]’s FollowMyHealth portal, you will also be able to view your health information using other applications (apps) compatible with our system.

## 2024-03-22 NOTE — ED PROVIDER NOTE - PROGRESS NOTE DETAILS
Patient with daughter feeling better.  Has been asking to eat.  CT head no intracranial hemorrhage no fracture.  Of the cervical spine.  Mild multilevel spondylolysis and degenerative disc disease.  CT chest abdomen pelvis no acute intrathoracic or abdominal pathology.  No bowel obstruction appendix is normal.  Stable hydropic gallbladder with a 1.1 cm stone at the gallbladder neck.  No gallbladder wall thickening or pericholecystic fluid.  LFTs were normal.  Troponin 8 with a CK of 66.  Bilateral knee x-rays no fracture.  Plan DC home with daughter.  Will give results of copies.  Recommended close follow-up with her primary doctor specially regarding the stone in the gallbladder neck patient has no abdominal pain in the right upper quadrant.  Has no nausea no vomiting LFTs are normal.  Will discharge home with strict return precautions.pt has declined pain med. pt tolerated a cookie in ED. no n/v  pt discharged with an incentive spirometry. Dc copies of results

## 2024-03-22 NOTE — ED ADULT TRIAGE NOTE - CHIEF COMPLAINT QUOTE
Pt  s/p fall this past Wednesday fell over a step in her garage. pt reports pain with cough. no bruising noted to rib area. pt with bruise to b/l knees. knee. Pt denies any LOC. pt deneis use of blood thinners.

## 2024-03-22 NOTE — ED PROVIDER NOTE - OBJECTIVE STATEMENT
Dr Shah  78-year-old female history of hypertension, chronic left shoulder pain, anxiety from home with daughter Noa at bedside status post fall 2 days ago.  Patient offered and declined translation services, daughter Noa would like to give history.  Patient agrees to plan.  Patient was in the garage 2 days ago around noon tripped and fell forward onto cement floor, hitting the left side of her chest and bilateral knees.  She immediately felt pain with inspiration, was helped up by family.  And since then has had pain with movement and taking a deep breath.  Pain with coughing.  Pain is localized to the left lateral chest wall and left upper abdomen.  Denies hitting her head.  Denies feeling dizzy or lightheaded before or after the fall.  Denies any nausea, vomiting.  Denies palpitations.  Denies any taking any blood thinners.  Has taken Motrin about 6 tablets with no relief.  Noted to have pain to bilateral knees but has been ambulatory.

## 2024-03-22 NOTE — ED ADULT NURSE NOTE - OBJECTIVE STATEMENT
Patient received intake, a&ox4. s/p mechanical trip and fall this past Wednesday. Pt reports falling to her knees and onto her left side. Pt denies hitting head, blood thinner use, LOC. c/o left rib pain, worse with coughing and movement. Pt denies chest pain, sob, n+v, headache, dizziness. Breathing even, unlabored. PERRLA. 20g IV placed to right ac, labs collected and sent. Safety maintained.

## 2024-03-22 NOTE — ED PROVIDER NOTE - CLINICAL SUMMARY MEDICAL DECISION MAKING FREE TEXT BOX
SP mechanical fall : Plan EKG, labs, chest x-ray, CT head, CT chest abdomen pelvis trauma protocol, x-ray bilateral knees, pain meds and reassess.  Patient offered and declined pain medications at this time.  Plan discussed with patient and daughter Noa at bedside

## 2024-03-22 NOTE — ED ADULT NURSE NOTE - NSFALLHARMRISKINTERV_ED_ALL_ED

## 2024-03-22 NOTE — ED PROVIDER NOTE - PHYSICAL EXAMINATION
Dr. Shah  Vital signs noted patient not tachycardic, not tachypneic not hypoxic  Patient ambulatory well-appearing female.  ANO x 3.  No sign of head or facial trauma.  No facial asymmetry no slurred speech no epistasis.  Able to stand.  No tenderness midline cervical, thoracic or lumbar spine.  No bruising or ecchymosis of the back.  Tender to the left lateral chest wall with no crepitus.  Able to take deep breaths with some pain.  Tender to the left upper abdomen.  Abdomen is soft nondistended.  No CVA tenderness.  Stable pelvis.  Moving all extremities.  Normal range of motion of bilateral hips.  No rotation or or shortening of lower extremities.  Normal range of motion of bilateral knees, no laxity bilateral knees.  No lacerations or abrasions.  Mild superficial bruising of the left knee greater than right.  Nontender bilateral ankles.  Normal range of motion of bilateral arms.  Nontender bilateral shoulders/elbows/hands.  Normal sensation and strength bilaterally.

## 2024-03-22 NOTE — ED PROVIDER NOTE - NSFOLLOWUPINSTRUCTIONS_ED_ALL_ED_FT
Please follow-up with your primary doctor in 1 to 2 days.  Take the results of the CAT scan to your next appointment.  Will need to follow-up regarding the gallbladder stone.  Recommend to use the incentive spirometer at home as tolerated.  Can take Tylenol Motrin for pain.  Return to the emergency department for any worsening of symptoms.    [facility]  General Internal Medicine  [address] [place] [address]  Phone: [phone]  Fax:     [facility]  Internal Medicine  [address] [place] [address]  Phone: [phone]  Fax: [phone]    [facility]  Cardiology  [address] [place] [id number]  Phone: [phone]    Chest Pain  WHAT YOU NEED TO KNOW:  Chest pain can be caused by a range of conditions, from not serious to life-threatening. Chest pain can be a symptom of a digestive problem, such as acid reflux or a stomach ulcer. An anxiety attack or a strong emotion, such as anger, can also cause chest pain. Infection, inflammation, or a fracture in the bones or cartilage in your chest can cause pain or discomfort. Sometimes chest pain or pressure is caused by poor blood flow to your heart (angina). Chest pain may also be caused by life-threatening conditions such as a heart attack or blood clot in your lungs.   DISCHARGE INSTRUCTIONS:  Call 911 if:   •You have any of the following signs of a heart attack: ?Squeezing, pressure, or pain in your chest  ?You may also have any of the following: ?Discomfort or pain in your back, neck, jaw, stomach, or arm  ?Shortness of breath  ?Nausea or vomiting  ?Lightheadedness or a sudden cold sweat  Seek care immediately if:   •You have chest discomfort that gets worse, even with medicine.  •You cough or vomit blood.   •Your bowel movements are black or bloody.   •You cannot stop vomiting, or it hurts to swallow.   Contact your healthcare provider if:   •You have questions or concerns about your condition or care.  Medicines:   •Medicines may be given to treat the cause of your chest pain. Examples include pain medicine, anxiety medicine, or medicines to increase blood flow to your heart.   •Do not take certain medicines without asking your healthcare provider first. These include NSAIDs, herbal or vitamin supplements, or hormones (estrogen or progestin).   •Take your medicine as directed. Contact your healthcare provider if you think your medicine is not helping or if you have side effects. Tell him or her if you are allergic to any medicine. Keep a list of the medicines, vitamins, and herbs you take. Include the amounts, and when and why you take them. Bring the list or the pill bottles to follow-up visits. Carry your medicine list with you in case of an emergency.  Follow up with your healthcare provider within 72 hours, or as directed: You may need to return for more tests to find the cause of your chest pain. You may be referred to a specialist, such as a cardiologist or gastroenterologist. Write down your questions so you remember to ask them during your visits.   Healthy living tips: The following are general healthy guidelines. If your chest pain is caused by a heart problem, your healthcare provider will give you specific guidelines to follow.  •Do not smoke. Nicotine and other chemicals in cigarettes and cigars can cause lung and heart damage. Ask your healthcare provider for information if you currently smoke and need help to quit. E-cigarettes or smokeless tobacco still contain nicotine. Talk to your healthcare provider before you use these products.   •Eat a variety of healthy, low-fat, low-salt foods. Healthy foods include fruits, vegetables, whole-grain breads, low-fat dairy products, beans, lean meats, and fish. Ask for more information about a heart healthy diet.  •Drink plenty of water every day. Your body is made of mostly water. Water helps your body to control temperature and blood pressure. Ask your healthcare provider how much water you should drink every day.  •Ask about activity. Your healthcare provider will tell you which activities to limit or avoid. Ask when you can drive, return to work, and have sex. Ask about the best exercise plan for you.  •Maintain a healthy weight. Ask your healthcare provider how much you should weigh. Ask him or her to help you create a weight loss plan if you are overweight.   If you have a stent:   •Carry your stent card with you at all times.   •Let all healthcare providers know that you have a stent.     [facility] Internal Medicine  General Internal Medicine  [address] [place] [address]  Phone: [phone]  Fax:     [facility]  Internal Medicine  [address] [place] [address]  Phone: [phone]  Fax: [phone]    [facility]  Cardiology  [address] [place] [address]  Phone: [phone]    Chest Pain  WHAT YOU NEED TO KNOW:  Chest pain can be caused by a range of conditions, from not serious to life-threatening. Chest pain can be a symptom of a digestive problem, such as acid reflux or a stomach ulcer. An anxiety attack or a strong emotion, such as anger, can also cause chest pain. Infection, inflammation, or a fracture in the bones or cartilage in your chest can cause pain or discomfort. Sometimes chest pain or pressure is caused by poor blood flow to your heart (angina). Chest pain may also be caused by life-threatening conditions such as a heart attack or blood clot in your lungs.   DISCHARGE INSTRUCTIONS:  Call 911 if:   •You have any of the following signs of a heart attack: ?Squeezing, pressure, or pain in your chest  ?You may also have any of the following: ?Discomfort or pain in your back, neck, jaw, stomach, or arm  ?Shortness of breath  ?Nausea or vomiting  ?Lightheadedness or a sudden cold sweat  Seek care immediately if:   •You have chest discomfort that gets worse, even with medicine.  •You cough or vomit blood.   •Your bowel movements are black or bloody.   •You cannot stop vomiting, or it hurts to swallow.   Contact your healthcare provider if:   •You have questions or concerns about your condition or care.  Medicines:   •Medicines may be given to treat the cause of your chest pain. Examples include pain medicine, anxiety medicine, or medicines to increase blood flow to your heart.   •Do not take certain medicines without asking your healthcare provider first. These include NSAIDs, herbal or vitamin supplements, or hormones (estrogen or progestin).   •Take your medicine as directed. Contact your healthcare provider if you think your medicine is not helping or if you have side effects. Tell him or her if you are allergic to any medicine. Keep a list of the medicines, vitamins, and herbs you take. Include the amounts, and when and why you take them. Bring the list or the pill bottles to follow-up visits. Carry your medicine list with you in case of an emergency.  Follow up with your healthcare provider within 72 hours, or as directed: You may need to return for more tests to find the cause of your chest pain. You may be referred to a specialist, such as a cardiologist or gastroenterologist. Write down your questions so you remember to ask them during your visits.   Healthy living tips: The following are general healthy guidelines. If your chest pain is caused by a heart problem, your healthcare provider will give you specific guidelines to follow.  •Do not smoke. Nicotine and other chemicals in cigarettes and cigars can cause lung and heart damage. Ask your healthcare provider for information if you currently smoke and need help to quit. E-cigarettes or smokeless tobacco still contain nicotine. Talk to your healthcare provider before you use these products.   •Eat a variety of healthy, low-fat, low-salt foods. Healthy foods include fruits, vegetables, whole-grain breads, low-fat dairy products, beans, lean meats, and fish. Ask for more information about a heart healthy diet.  •Drink plenty of water every day. Your body is made of mostly water. Water helps your body to control temperature and blood pressure. Ask your healthcare provider how much water you should drink every day.  •Ask about activity. Your healthcare provider will tell you which activities to limit or avoid. Ask when you can drive, return to work, and have sex. Ask about the best exercise plan for you.  •Maintain a healthy weight. Ask your healthcare provider how much you should weigh. Ask him or her to help you create a weight loss plan if you are overweight.   If you have a stent:   •Carry your stent card with you at all times.   •Let all healthcare providers know that you have a stent.     Dolor de pecho    LO QUE NECESITA SABER:    El dolor en el pecho puede ser provocado por mariel variedad de condiciones, algunas no tan serias y otras que son de peligro mortal. El dolor de pecho también puede ser un síntoma de un problema digestivo, dawood la acidez o mariel úlcera estomacal. Un ataque de ansiedad o mariel emoción justine, dawood el enojo, también pueden provocar dolor de pecho. Mariel infección, inflamación o fractura en un hueso o cartílago en el pecho podría provocar dolor o molestia. En ocasiones el dolor torácico o la presión en el pecho pueden ser el resultado de johann circulación de la janes al corazón (angina). El dolor de pecho también puede ser causado por trastornos potencialmente mortales dawood un ataque al corazón o un coágulo de janes en los pulmones.    INSTRUCCIONES SOBRE EL OFELIA HOSPITALARIA:    Llame al número local de emergencias (911 en los [place]) o pídale a alguien que llame si:    Tiene alguno de los siguientes signos de un ataque cardíaco:  Estrujamiento, presión o tensión en castro pecho    Usted también podría presentar alguno de los siguientes:  Malestar o dolor en castro espalda, umm, mandíbula, abdomen, o brazo    Falta de aliento    Náuseas o vómitos    Desvanecimiento o sudor frío repentino    Busque atención médica de inmediato si:    La inflamación en castro pecho empeora, aun con tratamiento.    Usted tose o vomita janes.    Damari heces son negras o tienen janes.    Usted no puede dejar de vomitar o le duele al tragar.  Llame a castro médico si:    Usted tiene preguntas o inquietudes acerca de castro condición o cuidado.    Medicamentos:    Los medicamentospueden administrarse para tratar la causa del dolor de pecho. Por ejemplo, analgésicos, medicamentos para la ansiedad o medicamentos para aumentar el flujo de janes al corazón.    No tome ciertos medicamentos sin antes preguntarle a castro médico.Estos incluyen TAAMRA, suplementos vitamínicos o a base de hierbas u hormonas (estrógeno o progestágeno).    [place] damari medicamentos dawood se le haya indicado.Consulte con castro médico si usted randolph que castro medicamento no le está ayudando o si presenta efectos secundarios. Infórmele si es alérgico a cualquier medicamento. Mantenga mariel lista actualizada de los medicamentos, las vitaminas y los productos herbales que erika. Incluya los siguientes datos de los medicamentos: cantidad, frecuencia y motivo de administración. Traiga con usted la lista o los envases de las píldoras a damari citas de seguimiento. Lleve la lista de los medicamentos con usted en malcolm de mariel emergencia.  Consejos para vivir saludable:Los siguientes son consejos generales de jenniffer. Si se conoce la causa de castro dolor de pecho, castro médico le dará pautas específicas a seguir.    No fume.La nicotina y otros químicos contenidos en los cigarrillos y cigarros pueden causar daño a damari pulmones y el corazón. Pida información a castro médico si usted actualmente fuma y necesita ayuda para dejar de fumar. Los cigarrillos electrónicos o el tabaco sin humo igualmente contienen nicotina. Consulte con castro médico antes de utilizar estos productos.    Elija mariel variedad de alimentos saludables tan a menudo dawood sea posible.Incluya frutas y verduras frescas, congeladas o enlatadas. También incluya productos lácteos bajos en grasa, pescado, khushboo (sin piel) y maliha magras. Castro médico o dietista pueden ayudarlo a crear planes de alimentos. Es posible que tenga que evitar ciertos alimentos o bebidas si el dolor es causado por un problema de digestión.  Alimentos saludables      Reduzca el consumo de sodio (sal).Limite el consumo de alimentos altos en sodio, dawood comidas enlatadas, bocadillos salados y embutidos. Si añade belén cuando cocina la comida, no añada más en la li. Elija alimentos enlatados bajos en sodio tanto dawood sea posible.        Consuma abundante agua al día.El agua ayuda al cuerpo a controlar la temperatura y la presión arterial. Pregunte a castro médico cuál es la cantidad de agua que debería consumir cada día.    Pregunte acerca de la actividad.Castro médico le dirá cuáles actividades limitar y cuáles evitar. Pregunte cuándo puede manejar, regresar a castro trabajo y tener relaciones sexuales. Pida más información acerca de un plan de ejercicio adecuado para usted.    Mantenga un peso saludable.Pregúntele a castro médico cuál es el peso ideal para usted. Pídale que lo ayude a crear un plan seguro para bajar de peso si tiene sobrepeso.    Pregunte sobre las vacunas que pudiera necesitar.Vacúnese contra la influenza (gripe) todos los años tan pronto dawood se recomiende, normalmente en septiembre u octubre. Usted también podría necesitar la vacuna antineumocócica para evitar la neumonía. La vacuna se administra generalmente cada 5 años, a partir de los 65 años. Castro médico puede indicarle si debe recibir otras vacunas, y cuándo aplicárselas.  Programe mariel trinity con castro médico dentro de 72 horas o dawood se le indique:Es posible que deba regresar para hacerse más pruebas para encontrar la causa del dolor de pecho. Es probable que lo refieran a un especialista, dawood un cardiólogo o un gastroenterólogo. Anote admari preguntas para que se acuerde de hacerlas joseph damari visitas.

## 2024-03-23 VITALS
RESPIRATION RATE: 16 BRPM | OXYGEN SATURATION: 97 % | SYSTOLIC BLOOD PRESSURE: 124 MMHG | DIASTOLIC BLOOD PRESSURE: 73 MMHG | HEART RATE: 66 BPM | TEMPERATURE: 98 F

## 2024-06-16 ENCOUNTER — RX RENEWAL (OUTPATIENT)
Age: 79
End: 2024-06-16

## 2024-06-16 RX ORDER — HYDROCHLOROTHIAZIDE 12.5 MG/1
12.5 TABLET ORAL
Qty: 90 | Refills: 1 | Status: ACTIVE | COMMUNITY
Start: 2023-07-27 | End: 1900-01-01

## 2024-06-18 PROBLEM — I25.10 CAD (CORONARY ARTERY DISEASE), NATIVE CORONARY ARTERY: Status: ACTIVE | Noted: 2022-10-25

## 2024-06-18 NOTE — PHYSICAL EXAM
[General Appearance - Well Developed] : well developed [Normal Appearance] : normal appearance [Well Groomed] : well groomed [General Appearance - Well Nourished] : well nourished [No Deformities] : no deformities [Normal Conjunctiva] : the conjunctiva exhibited no abnormalities [General Appearance - In No Acute Distress] : no acute distress [Eyelids - No Xanthelasma] : the eyelids demonstrated no xanthelasmas [Normal Oral Mucosa] : normal oral mucosa [No Oral Pallor] : no oral pallor [No Oral Cyanosis] : no oral cyanosis [Normal Jugular Venous A Waves Present] : normal jugular venous A waves present [Normal Jugular Venous V Waves Present] : normal jugular venous V waves present [No Jugular Venous Aggarwal A Waves] : no jugular venous aggarwal A waves [Respiration, Rhythm And Depth] : normal respiratory rhythm and effort [Exaggerated Use Of Accessory Muscles For Inspiration] : no accessory muscle use [Auscultation Breath Sounds / Voice Sounds] : lungs were clear to auscultation bilaterally [Heart Rate And Rhythm] : heart rate and rhythm were normal [Heart Sounds] : normal S1 and S2 [Murmurs] : no murmurs present [Arterial Pulses Normal] : the arterial pulses were normal [Edema] : no peripheral edema present [Abdomen Soft] : soft [Abdomen Tenderness] : non-tender [Abdomen Mass (___ Cm)] : no abdominal mass palpated [Abnormal Walk] : normal gait [Gait - Sufficient For Exercise Testing] : the gait was sufficient for exercise testing [Nail Clubbing] : no clubbing of the fingernails [Cyanosis, Localized] : no localized cyanosis [Petechial Hemorrhages (___cm)] : no petechial hemorrhages [] : no ischemic changes [Oriented To Time, Place, And Person] : oriented to person, place, and time [Affect] : the affect was normal [Mood] : the mood was normal [No Anxiety] : not feeling anxious

## 2024-06-19 ENCOUNTER — APPOINTMENT (OUTPATIENT)
Dept: CARDIOLOGY | Facility: CLINIC | Age: 79
End: 2024-06-19
Payer: MEDICARE

## 2024-06-19 ENCOUNTER — NON-APPOINTMENT (OUTPATIENT)
Age: 79
End: 2024-06-19

## 2024-06-19 VITALS
OXYGEN SATURATION: 95 % | DIASTOLIC BLOOD PRESSURE: 81 MMHG | RESPIRATION RATE: 16 BRPM | BODY MASS INDEX: 28.53 KG/M2 | SYSTOLIC BLOOD PRESSURE: 137 MMHG | HEART RATE: 69 BPM | WEIGHT: 156 LBS

## 2024-06-19 DIAGNOSIS — I10 ESSENTIAL (PRIMARY) HYPERTENSION: ICD-10-CM

## 2024-06-19 DIAGNOSIS — I25.10 ATHEROSCLEROTIC HEART DISEASE OF NATIVE CORONARY ARTERY W/OUT ANGINA PECTORIS: ICD-10-CM

## 2024-06-19 PROCEDURE — 99214 OFFICE O/P EST MOD 30 MIN: CPT | Mod: 25

## 2024-06-19 PROCEDURE — 93000 ELECTROCARDIOGRAM COMPLETE: CPT

## 2024-06-19 RX ORDER — ASPIRIN 81 MG/1
81 TABLET, COATED ORAL
Qty: 90 | Refills: 1 | Status: ACTIVE | COMMUNITY
Start: 2023-06-20 | End: 1900-01-01

## 2024-06-19 RX ORDER — LANCETS 33 GAUGE
EACH MISCELLANEOUS
Qty: 270 | Refills: 3 | Status: DISCONTINUED | COMMUNITY
Start: 2018-11-13 | End: 2024-06-19

## 2024-06-20 RX ORDER — AMLODIPINE BESYLATE 2.5 MG/1
2.5 TABLET ORAL
Qty: 90 | Refills: 0 | Status: ACTIVE | COMMUNITY
Start: 2022-10-13 | End: 1900-01-01

## 2024-06-26 NOTE — REASON FOR VISIT
[FreeTextEntry1] : 79 year-old female with possible CAD (abnormal nuclear stress in 2022), HTN, hyperglycemia, kidney stone, hepatitis B carrier, anxiety disorder, presents for followup.    Patient was last seen on 1/3/24 - Patient reports that last night she woke with sweats and her BP was elevated at 153/101. Her FS was 170. Her BP yesterday AM was normal 111/70. Patient denies CP or SOB. I advised patient to start Amlodipine 2.5 mg in PM. I advised patient to continue HCTZ 12.5 mg AM, Metoprolol 50 mg BID, and Enalapril 10 mg BID for HTN. Patient reports anxiety and depression.  She is on ASA 81 mg for CAD.  She is on Amlodipine 2.5 mg, HCTZ 12.5 mg AM, Metoprolol 50 mg BID, and Enalapril 10 mg BID for HTN.  Patient wore a event monitor 10/30/23 and it showed average HR of 62, rare APC's, rare PVC's, and a short run of PAT (6 beats). No AFIB noted.  Patient underwent an echocardiogram 10/25/22 and it showed normal LV function without significant valvular pathology.    Patient underwent a pharmacologic nuclear stress test 10/18/22 and it showed mild RCA/LCx ischemia.  I advised patient to start ASA 81 mg for now.  Her LDL is <100 so will hold off statin for now.  Patient wore a 7 day zio patch 3/21/22 and it showed No A. Fib. Average HR 65, rare PVCs and couplets, rare PACs, couplets and triplets..   Echo on 1/3/19  showed normal LV function without significant valvular pathology.   treadmill stress test on 1/3/19 and completed 3 minutes of Glenn protocol.  There were upsloping ST depressions on ECG but no symptoms.  Following treadmill stress, there is no echocardiographic evidence of ischemia.

## 2024-06-26 NOTE — HISTORY OF PRESENT ILLNESS
[FreeTextEntry1] : 6/19/24- Patient denies CP, SOB, palpitations, or lightheadedness. Patient has been feeling fatigued with meds. Patient has trace edema. I advised patient to start on Amlodipine 2.5 mg. FU in 6 months.   1/3/24 - Patient reports that last night she woke with sweats and her BP was elevated at 153/101.  Her FS was 170.  Her BP yesterday AM was normal 111/70.  Patient denies CP or SOB.  I advised patient to start Amlodipine 2.5 mg in PM.  I advised patient to continue HCTZ 12.5 mg AM, Metoprolol 50 mg BID, and Enalapril 10 mg BID for HTN.  Patient reports anxiety and depression.    10/30/23 - Patient reports lower CP, described as palpitations.  Her BP was elevated in office but normal at home.  I advised patient to wear a 7-day event monitor.  Patient wore a event monitor and it showed average HR of 62, rare APC's, rare PVC's, and a short run of PAT (6 beats). No AFIB noted.  7/27/23 - Patient reports ER visit in May for Cholelithiasis. But no colic. No surgery. Patient reports left chest discomfort with sensation of cough. It occurs more often at night. She takes Famotidine 40 mg occasionally. Her recent LDL is 98.  2/16/23 - Pt reports tiredness. Her BP sometimes 90s/60s. She is on Metoprolol ER 50 mg BID, Enalapril 10 mg BID, Amlodipine 2.5 mg, and ASA 81 mg. Pt reports occasional palpitations. Pt denies CP and SOB. Pt reports B/L forearm swelling sometimes. Fu in 3 months.   10/25/22 - Patient reports palpitations for 1 week since starting Amlodipine.  I advised patient to undergo an echocardiogram.  Patient underwent an echocardiogram and it showed normal LV function without significant valvular pathology.  I advised patient to stop Amlodipine.  I advised patient to increase Metoprolol to 50 mg BD.    10/13/22 - Patient reports left-sided CP yesterday but OK today.  Patient reports palpitations lasting seconds similar to March.  Her BP was elevated.  I advised patient to start Amlodipine 2.5 mg QD.  I advised patient to undergo a pharmacologic nuclear stress test. Patient underwent a pharmacologic nuclear stress test and it showed mild RCA/LCx ischemia.  I advised patient to start ASA 81 mg for now.  Her LDL is <100 so will hold off statin for now.  3/21/22 - Patient reports L sided discomfort focally from palpitations lasting seconds.  Patient denies CP. Patient denies SOB. She would feel it at night during her sleep. I advised patient to wear 7 day zio patch and it showed No A. Fib. Average HR 65, rare PVCs and couplets, rare PACs, couplets and triplets..   12/13/21 - Patient was diagnosed with glaucoma but she does not feel comfortable with the eyedrops and would like to get second opinion, Patient reports osteoarthritis with knee pain. Patient also reports getting leg cramps last night while cooking in kitchen. I advised patient to supplement with magnesium.   6/4/21 - Patient has been stable.  Patient denies CP, SOB, palpitations, or lightheadedness.  She has trace LE edema.  I advised patient to undergo an echocardiogram.   ECG for HTN.  11/21/19 - Patient reports that she fell from stool and fractured several ribs in June. Patient was hospitalized to r/o pneumonia. Patient reports occasional CP when she is tired.  Patient has occasional palpitations lasting seconds. Her BP is 120/80 at home.

## 2024-09-13 ENCOUNTER — RX RENEWAL (OUTPATIENT)
Age: 79
End: 2024-09-13

## 2024-09-19 ENCOUNTER — RX RENEWAL (OUTPATIENT)
Age: 79
End: 2024-09-19

## 2024-09-20 ENCOUNTER — RX RENEWAL (OUTPATIENT)
Age: 79
End: 2024-09-20

## 2024-10-11 ENCOUNTER — APPOINTMENT (OUTPATIENT)
Dept: ULTRASOUND IMAGING | Facility: IMAGING CENTER | Age: 79
End: 2024-10-11
Payer: MEDICARE

## 2024-10-11 ENCOUNTER — APPOINTMENT (OUTPATIENT)
Dept: RADIOLOGY | Facility: IMAGING CENTER | Age: 79
End: 2024-10-11
Payer: MEDICARE

## 2024-10-11 ENCOUNTER — APPOINTMENT (OUTPATIENT)
Dept: MAMMOGRAPHY | Facility: IMAGING CENTER | Age: 79
End: 2024-10-11
Payer: MEDICARE

## 2024-10-11 ENCOUNTER — OUTPATIENT (OUTPATIENT)
Dept: OUTPATIENT SERVICES | Facility: HOSPITAL | Age: 79
LOS: 1 days | End: 2024-10-11
Payer: MEDICARE

## 2024-10-11 DIAGNOSIS — Z00.8 ENCOUNTER FOR OTHER GENERAL EXAMINATION: ICD-10-CM

## 2024-10-11 PROCEDURE — 76641 ULTRASOUND BREAST COMPLETE: CPT | Mod: 26,50

## 2024-10-11 PROCEDURE — 77067 SCR MAMMO BI INCL CAD: CPT | Mod: 26

## 2024-10-11 PROCEDURE — 77063 BREAST TOMOSYNTHESIS BI: CPT

## 2024-10-11 PROCEDURE — 77063 BREAST TOMOSYNTHESIS BI: CPT | Mod: 26

## 2024-10-11 PROCEDURE — 77067 SCR MAMMO BI INCL CAD: CPT

## 2024-10-11 PROCEDURE — 77080 DXA BONE DENSITY AXIAL: CPT

## 2024-10-11 PROCEDURE — 77080 DXA BONE DENSITY AXIAL: CPT | Mod: 26

## 2024-10-11 PROCEDURE — 76641 ULTRASOUND BREAST COMPLETE: CPT

## 2024-11-12 ENCOUNTER — APPOINTMENT (OUTPATIENT)
Dept: CARDIOLOGY | Facility: CLINIC | Age: 79
End: 2024-11-12
Payer: MEDICARE

## 2024-11-12 ENCOUNTER — NON-APPOINTMENT (OUTPATIENT)
Age: 79
End: 2024-11-12

## 2024-11-12 VITALS
OXYGEN SATURATION: 96 % | SYSTOLIC BLOOD PRESSURE: 155 MMHG | RESPIRATION RATE: 18 BRPM | BODY MASS INDEX: 28.17 KG/M2 | HEART RATE: 62 BPM | WEIGHT: 154 LBS | DIASTOLIC BLOOD PRESSURE: 83 MMHG

## 2024-11-12 PROCEDURE — 99214 OFFICE O/P EST MOD 30 MIN: CPT

## 2024-12-18 ENCOUNTER — NON-APPOINTMENT (OUTPATIENT)
Age: 79
End: 2024-12-18

## 2024-12-30 ENCOUNTER — RX RENEWAL (OUTPATIENT)
Age: 79
End: 2024-12-30

## 2025-06-12 ENCOUNTER — RX RENEWAL (OUTPATIENT)
Age: 80
End: 2025-06-12

## 2025-06-12 ENCOUNTER — APPOINTMENT (OUTPATIENT)
Dept: CARDIOLOGY | Facility: CLINIC | Age: 80
End: 2025-06-12

## 2025-06-12 VITALS
RESPIRATION RATE: 16 BRPM | SYSTOLIC BLOOD PRESSURE: 144 MMHG | HEART RATE: 70 BPM | WEIGHT: 150 LBS | BODY MASS INDEX: 27.44 KG/M2 | OXYGEN SATURATION: 95 % | DIASTOLIC BLOOD PRESSURE: 76 MMHG

## 2025-06-12 PROCEDURE — 99214 OFFICE O/P EST MOD 30 MIN: CPT

## 2025-06-12 RX ORDER — AMLODIPINE BESYLATE 2.5 MG/1
2.5 TABLET ORAL
Qty: 90 | Refills: 1 | Status: ACTIVE | COMMUNITY
Start: 2025-06-12 | End: 1900-01-01

## 2025-09-03 ENCOUNTER — APPOINTMENT (OUTPATIENT)
Dept: OTOLARYNGOLOGY | Facility: CLINIC | Age: 80
End: 2025-09-03
Payer: MEDICARE

## 2025-09-03 VITALS — WEIGHT: 150 LBS | BODY MASS INDEX: 27.6 KG/M2 | HEIGHT: 62 IN

## 2025-09-03 DIAGNOSIS — K21.9 GASTRO-ESOPHAGEAL REFLUX DISEASE W/OUT ESOPHAGITIS: ICD-10-CM

## 2025-09-03 DIAGNOSIS — H93.299 OTHER ABNORMAL AUDITORY PERCEPTIONS, UNSPECIFIED EAR: ICD-10-CM

## 2025-09-03 DIAGNOSIS — J31.0 CHRONIC RHINITIS: ICD-10-CM

## 2025-09-03 DIAGNOSIS — H90.5 UNSPECIFIED SENSORINEURAL HEARING LOSS: ICD-10-CM

## 2025-09-03 DIAGNOSIS — H61.20 IMPACTED CERUMEN, UNSPECIFIED EAR: ICD-10-CM

## 2025-09-03 PROCEDURE — G0268 REMOVAL OF IMPACTED WAX MD: CPT

## 2025-09-03 PROCEDURE — 92553 AUDIOMETRY AIR & BONE: CPT

## 2025-09-03 PROCEDURE — 31231 NASAL ENDOSCOPY DX: CPT

## 2025-09-03 PROCEDURE — 99203 OFFICE O/P NEW LOW 30 MIN: CPT | Mod: 25

## 2025-09-03 PROCEDURE — 92550 TYMPANOMETRY & REFLEX THRESH: CPT | Mod: 52

## 2025-09-03 RX ORDER — FAMOTIDINE 40 MG/1
40 TABLET, FILM COATED ORAL
Qty: 30 | Refills: 5 | Status: ACTIVE | COMMUNITY
Start: 2025-09-03 | End: 1900-01-01